# Patient Record
Sex: MALE | Race: WHITE | NOT HISPANIC OR LATINO | Employment: STUDENT | URBAN - METROPOLITAN AREA
[De-identification: names, ages, dates, MRNs, and addresses within clinical notes are randomized per-mention and may not be internally consistent; named-entity substitution may affect disease eponyms.]

---
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
## 2024-03-11 ENCOUNTER — OFFICE VISIT (OUTPATIENT)
Dept: FAMILY MEDICINE | Facility: CLINIC | Age: 19
End: 2024-03-11
Payer: COMMERCIAL

## 2024-03-11 VITALS
OXYGEN SATURATION: 100 % | HEART RATE: 68 BPM | WEIGHT: 131 LBS | TEMPERATURE: 98.2 F | BODY MASS INDEX: 17.74 KG/M2 | RESPIRATION RATE: 18 BRPM | DIASTOLIC BLOOD PRESSURE: 93 MMHG | SYSTOLIC BLOOD PRESSURE: 156 MMHG | HEIGHT: 72 IN

## 2024-03-11 DIAGNOSIS — F33.0 MAJOR DEPRESSIVE DISORDER, RECURRENT EPISODE, MILD (H): ICD-10-CM

## 2024-03-11 DIAGNOSIS — Z13.220 SCREENING FOR LIPOID DISORDERS: ICD-10-CM

## 2024-03-11 DIAGNOSIS — M62.831 MUSCLE SPASM OF RIGHT CALF: Primary | ICD-10-CM

## 2024-03-11 LAB
CHOLEST SERPL-MCNC: 162 MG/DL
ERYTHROCYTE [DISTWIDTH] IN BLOOD BY AUTOMATED COUNT: 11.8 % (ref 10–15)
FASTING STATUS PATIENT QL REPORTED: NORMAL
HCT VFR BLD AUTO: 45.6 % (ref 40–53)
HDLC SERPL-MCNC: 68 MG/DL
HGB BLD-MCNC: 15.3 G/DL (ref 13.3–17.7)
LDLC SERPL CALC-MCNC: 86 MG/DL
MCH RBC QN AUTO: 30.2 PG (ref 26.5–33)
MCHC RBC AUTO-ENTMCNC: 33.6 G/DL (ref 31.5–36.5)
MCV RBC AUTO: 90 FL (ref 78–100)
NONHDLC SERPL-MCNC: 94 MG/DL
PLATELET # BLD AUTO: 282 10E3/UL (ref 150–450)
RBC # BLD AUTO: 5.07 10E6/UL (ref 4.4–5.9)
T4 FREE SERPL-MCNC: 1.53 NG/DL (ref 0.9–1.7)
TRIGL SERPL-MCNC: 39 MG/DL
TSH SERPL DL<=0.005 MIU/L-ACNC: 5.11 UIU/ML (ref 0.3–4.2)
WBC # BLD AUTO: 6.3 10E3/UL (ref 4–11)

## 2024-03-11 PROCEDURE — 84443 ASSAY THYROID STIM HORMONE: CPT | Performed by: FAMILY MEDICINE

## 2024-03-11 PROCEDURE — 85027 COMPLETE CBC AUTOMATED: CPT | Performed by: FAMILY MEDICINE

## 2024-03-11 PROCEDURE — 84439 ASSAY OF FREE THYROXINE: CPT | Performed by: FAMILY MEDICINE

## 2024-03-11 PROCEDURE — 99214 OFFICE O/P EST MOD 30 MIN: CPT | Performed by: FAMILY MEDICINE

## 2024-03-11 PROCEDURE — 36415 COLL VENOUS BLD VENIPUNCTURE: CPT | Performed by: FAMILY MEDICINE

## 2024-03-11 PROCEDURE — 80061 LIPID PANEL: CPT | Performed by: FAMILY MEDICINE

## 2024-03-11 NOTE — PROGRESS NOTES
Assessment & Plan     Muscle spasm of right calf  Refer for PT. With the constellation of some learning delay could consider CP, but the physical findings are very isolated to the calf, more likely a prior injury to peroneal nerve or achilles injury though patient has no memory of this.  - Physical Therapy  Referral; Future    Major depressive disorder, recurrent episode, mild (H24)  Patient would like to start with psychotherapy. Consider medication-would rec bupriopion. His picture is more depressed than anxious, though his brother things of him as more anxious. He also does seem to have some executive function struggle, but likely also related to mood, not something like ADHD.  - Adult Mental Health  Referral; Future  - TSH with free T4 reflex; Future  - CBC with platelets; Future  - TSH with free T4 reflex  - CBC with platelets    Screening for lipoid disorders  - Lipid panel reflex to direct LDL Fasting; Future  - Lipid panel reflex to direct LDL Fasting    BP  Recheck in follow up                  No follow-ups on file.    Jason Emmanuel is a 19 year old, presenting for the following health issues:  Establish Care (Right leg concerns)        3/11/2024     9:08 AM   Additional Questions   Roomed by Awais   Accompanied by Brother         3/11/2024    Information    services provided? No     HPI     Jose Elias comes in with his older brother who recently moved back home with him and his dad and is noticing some challenges    Right leg problem-his brother has been taking him to the gym. They've noticed that his right foot when he does squats, he can't flex his right ankle to 90 degrees without pain. He has noticed this problem since at least age 10 or so, in middle school. Has not ever tried any medication for it. Has been trying stretching but not helping. Running on the leg doesn't hurt, can do like a half mile on the treadmill.   Struggling with some anxiety type of  symptoms. Mom struggled with alcohol and mental health and  a couple year ago. Jose Elias is really fearful of following in his mom's footsteps. Has really struggled to find a job, really fearful of driving. Has hated it when he tries working. Dad and brother really want Jose Elias to find his independence. Development- graduated high school, got extra help in Math in elementary. Excess worry but about internal things. He does have some problems with sleep, but more physical discomfort. Not overwhelmed by crowded situations or new people. Slowly ended up losing interest in driving, not scared by it. Right now he's working at [employer] but not getting a lot of hours. Thinking about doing investing. He's interest in computers but hasn't worked in computers. He has a hard time retaining his motivation. Doesn't have too much trouble with attention. Jose Elias feels really stuck. He had some suicidal ideation around the time her mom was dying of etoh complications but not recently. Mom's dad also maybe had schizophrenia.                   Objective    BP (!) 156/93   Pulse 68   Temp 98.2  F (36.8  C)   Resp 18   Ht 1.829 m (6')   Wt 59.4 kg (131 lb)   SpO2 100%   BMI 17.77 kg/m    Body mass index is 17.77 kg/m .  Physical Exam   GENERAL: alert and no distress  MS: Right ankle passive and action ROM limited to 90 degrees. Left ankle full ROM. Pain with R ankle flexion past 90 degrees as well as spasticity.            Signed Electronically by: Dorie Kiran MD

## 2024-03-11 NOTE — COMMUNITY RESOURCES LIST (ENGLISH)
03/11/2024   [facility]  N/A  For questions about this resource list or additional care needs, please contact your primary care clinic or care manager.  Phone: [phone]   Email: N/A   Address: [address] [place] [address]   Hours: N/A        Hotlines and Helplines       Hotline - Housing crisis  1  [facility] Distance: 10.35 miles      Phone/Virtual   [address] [place] [address]  Language: English  Hours: Mon - Sun Open 24 Hours   Phone: (947) 443-7156 Email: communications@[facility]-[place].org Website: https://[facility]-[place].org/oursaviourshousing/     2  [facility] Distance: 11.95 miles      Phone/Virtual   [address] [place] [address]  Language: English  Hours: Mon - Sun Open 24 Hours   Phone: (679) 101-2038 Email: info@[facility].[employer] Website: http://www.[facility].org          Housing       Coordinated Entry access point  3  [facility] Distance: 2.69 miles      In-Person, Phone/Virtual   [address] [place] [address]  Language: English  Hours: Mon - Fri 8:30 AM - 4:30 PM  Fees: Free   Phone: (494) 166-9739 Email: info@[facility].org Website: https://www.[facility].org/locations/[facility]/     4  [facility] - Coordinated Access to Housing and Shelter ([facility]) - Coordinated Access - Coordinated Entry access point Distance: 5.05 miles      In-Person, Phone/Virtual   [address] [place] [address]  Language: English  Hours: Mon - Fri 8:00 AM - 4:30 PM  Fees: Free   Phone: (487) 298-9860 Website: https://www.[facility]./residents/assistance-support/assistance/housing-services-support     Drop-in center or day shelter  5  [facility] Distance: 1.18 miles      In-Person   [address] [place] [address]  Language: English  Hours: Mon - Fri 9:00 AM - 4:00 PM  Fees: Free   Phone: (297) 997-6234 Email: charito@[facility].org Website:  http://[facility] Hospital[facility].org     6  [facility] Distance: 2.21 miles      In-Person   [address] [place] [address]  Language: English  Hours: Mon - Fri 10:00 AM - 6:00 PM  Fees: Free   Phone: (194) 345-5024 Email: [employer] Website: https://[employer]/support/youth/     Housing search assistance  7  [facility] Distance: 2.21 miles      In-Person, Phone/Virtual   [address] [place] [address]  Language: English  Hours: Mon - Fri 10:00 AM - 6:00 PM  Fees: Free   Phone: (637) 494-6641 Email: [employer] Website: https://[employer]/support/youth/     8  [facility] - Housing Search Assistance Distance: 2.47 miles      Phone/Virtual   [address] [place] [address]  Language: Wolof, English, Hmong, Arabella, Kazakh, Persian  Hours: Mon - Fri Appt. Only  Fees: Free   Phone: (746) 785-6957 Website: https://[employer] [facility].org/     Shelter for families  9  [facility] Distance: 13.85 miles      In-Person   [address] [place] [address]  Language: English  Hours: Mon - Fri 3:00 PM - 9:00 AM , Sat - Sun Open 24 Hours  Fees: Free   Phone: (799) 330-5339 Ext.1 Website: https://www.[facility].org/2020/07/03/emergency-family-shelter/     Shelter for individuals  10  [place] [facility] - Housing Help Distance: 2.39 miles      Phone/Virtual   [address] [place] [address]  Language: English  Hours: Mon - Fri 8:00 AM - 5:00 PM   Phone: (197) 370-4692 Email: [place].housing@[facility].mn. Website: https://[facility].org/     11  [facility] [place] [facility] - [facility] [place] [facility] Distance: 2.73 miles      In-Person   [address] [place] [address]  Language: English  Hours: Mon - Sun 5:00 PM - 10:00 AM  Fees: Free, Self Pay   Phone: (128) 767-9891 Email: info@[employer] Website:  https://www.[employer]/locations/[facility]/     Shelter for youth  12  [facility] - Emergency Youth Shelter Distance: 5.58 miles      In-Person   [address] [place] [address]  Language: English  Hours: Mon - Sun Open 24 Hours  Fees: Free   Phone: (450) 462-2426 Email: diandra@[facility].org Website: https://[facility]/     13  [facility] - Emergency Youth Shelter Distance: 8.72 miles      In-Person   [address] [place] [address]  Language: English  Hours: Mon - Sun Open 24 Hours  Fees: Free   Phone: (130) 695-5779 Email: info@[employer] Website: https://www.[employer]/locations/hope-[place]/          Important Numbers & Websites       Emergency Services   [address]  [place] [address]  Poison Control   (469) 879-8369  Suicide Prevention Lifeline   (114) 894-6061 (TALK)  Child Abuse Hotline   (214) 565-2537 (4-A-Child)  Sexual Assault Hotline   (117) 144-7200 (HOPE)  National Runaway Safeline   (417) 760-7527 (RUNAWAY)  All-Options Talkline   (221) 309-4616  Substance Abuse Referral   (922) 260-5240 (HELP)

## 2024-03-14 DIAGNOSIS — E03.8 SUBCLINICAL HYPOTHYROIDISM: Primary | ICD-10-CM

## 2024-03-25 ENCOUNTER — TELEPHONE (OUTPATIENT)
Dept: CARE COORDINATION | Facility: CLINIC | Age: 19
End: 2024-03-25
Payer: COMMERCIAL

## 2024-03-25 NOTE — CONFIDENTIAL NOTE
SW attempted to call patient regarding referral for psychotherapy but number not in service this date.    ROCHELLE CADET, LGSW, LADC

## 2024-04-22 ENCOUNTER — VIRTUAL VISIT (OUTPATIENT)
Dept: PSYCHOLOGY | Facility: CLINIC | Age: 19
End: 2024-04-22
Attending: FAMILY MEDICINE
Payer: COMMERCIAL

## 2024-04-22 DIAGNOSIS — F33.0 MAJOR DEPRESSIVE DISORDER, RECURRENT EPISODE, MILD (H): ICD-10-CM

## 2024-04-22 PROCEDURE — 90791 PSYCH DIAGNOSTIC EVALUATION: CPT | Mod: 95 | Performed by: COUNSELOR

## 2024-04-22 ASSESSMENT — COLUMBIA-SUICIDE SEVERITY RATING SCALE - C-SSRS
6. HAVE YOU EVER DONE ANYTHING, STARTED TO DO ANYTHING, OR PREPARED TO DO ANYTHING TO END YOUR LIFE?: YES
REASONS FOR IDEATION LIFETIME: MOSTLY TO END OR STOP THE PAIN (YOU COULDN'T GO ON LIVING WITH THE PAIN OR HOW YOU WERE FEELING)
1. HAVE YOU WISHED YOU WERE DEAD OR WISHED YOU COULD GO TO SLEEP AND NOT WAKE UP?: YES
4. HAVE YOU HAD THESE THOUGHTS AND HAD SOME INTENTION OF ACTING ON THEM?: NO
2. HAVE YOU ACTUALLY HAD ANY THOUGHTS OF KILLING YOURSELF?: YES
3. HAVE YOU BEEN THINKING ABOUT HOW YOU MIGHT KILL YOURSELF?: YES
4. HAVE YOU HAD THESE THOUGHTS AND HAD SOME INTENTION OF ACTING ON THEM?: YES
TOTAL  NUMBER OF INTERRUPTED ATTEMPTS LIFETIME: NO
2. HAVE YOU ACTUALLY HAD ANY THOUGHTS OF KILLING YOURSELF?: NO
TOTAL  NUMBER OF PREPARATORY ACTS LIFETIME: 1
TOTAL  NUMBER OF ABORTED OR SELF INTERRUPTED ATTEMPTS LIFETIME: YES
5. HAVE YOU STARTED TO WORK OUT OR WORKED OUT THE DETAILS OF HOW TO KILL YOURSELF? DO YOU INTEND TO CARRY OUT THIS PLAN?: NO
TOTAL  NUMBER OF ABORTED OR SELF INTERRUPTED ATTEMPTS LIFETIME: 1
6. HAVE YOU EVER DONE ANYTHING, STARTED TO DO ANYTHING, OR PREPARED TO DO ANYTHING TO END YOUR LIFE?: NO
ATTEMPT LIFETIME: NO
1. IN THE PAST MONTH, HAVE YOU WISHED YOU WERE DEAD OR WISHED YOU COULD GO TO SLEEP AND NOT WAKE UP?: NO
TOTAL  NUMBER OF ABORTED OR SELF INTERRUPTED ATTEMPTS PAST 3 MONTHS: NO

## 2024-04-22 ASSESSMENT — ANXIETY QUESTIONNAIRES
1. FEELING NERVOUS, ANXIOUS, OR ON EDGE: NOT AT ALL
5. BEING SO RESTLESS THAT IT IS HARD TO SIT STILL: NOT AT ALL
GAD7 TOTAL SCORE: 0
6. BECOMING EASILY ANNOYED OR IRRITABLE: NOT AT ALL
GAD7 TOTAL SCORE: 0
4. TROUBLE RELAXING: NOT AT ALL
3. WORRYING TOO MUCH ABOUT DIFFERENT THINGS: NOT AT ALL
2. NOT BEING ABLE TO STOP OR CONTROL WORRYING: NOT AT ALL
7. FEELING AFRAID AS IF SOMETHING AWFUL MIGHT HAPPEN: NOT AT ALL

## 2024-04-22 ASSESSMENT — PATIENT HEALTH QUESTIONNAIRE - PHQ9
10. IF YOU CHECKED OFF ANY PROBLEMS, HOW DIFFICULT HAVE THESE PROBLEMS MADE IT FOR YOU TO DO YOUR WORK, TAKE CARE OF THINGS AT HOME, OR GET ALONG WITH OTHER PEOPLE: SOMEWHAT DIFFICULT
SUM OF ALL RESPONSES TO PHQ QUESTIONS 1-9: 10
SUM OF ALL RESPONSES TO PHQ QUESTIONS 1-9: 10

## 2024-04-22 NOTE — PROGRESS NOTES
"[facility]         PATIENT'S NAME: Cholo Friend  PREFERRED NAME: Jose Elias  PRONOUNS:       MRN: [id number]  : 2005  ADDRESS: [address] [place] [address]  ACCT. NUMBER:  [id number]  DATE OF SERVICE: 24  START TIME: 11:00a  END TIME: 11:55a  PREFERRED PHONE: [phone]  May we leave a program related message: Yes  EMERGENCY CONTACT: was obtained .  SERVICE MODALITY:  Video Visit:      Provider verified identity through the following two step process.  Patient provided:  Patient     Telemedicine Visit: The patient's condition can be safely assessed and treated via synchronous audio and visual telemedicine encounter.      Reason for Telemedicine Visit: Patient has requested telehealth visit    Originating Site (Patient Location): Patient's home    Distant Site (Provider Location): Provider Remote Setting- Home Office    Consent:  The patient/guardian has verbally consented to: the potential risks and benefits of telemedicine (video visit) versus in person care; bill my insurance or make self-payment for services provided; and responsibility for payment of non-covered services.     Patient would like the video invitation sent by:  My Chart    Mode of Communication:  Video Conference via [employer]    Distant Location (Provider):  Off-site    As the provider I attest to compliance with applicable laws and regulations related to telemedicine.    UNIVERSAL ADULT Mental Health DIAGNOSTIC ASSESSMENT    Identifying Information:  Patient is a 19 year old,   individual.  Patient was referred for an assessment by primary care clinic.  Patient attended the session alone.    Chief Complaint:   The reason for seeking services at this time is: \"Mental Health\".  Pt reported he was recommended by his PCP for therapy but was not entirely sure why he was here. Pt reported he does have some depressive symptoms. The problem(s) began 19.    Patient has not attempted to resolve " these concerns in the past.    Social/Family History:  Patient reported they grew up in other [facility].  They were raised by biological parents  .  Parents were always together.  Pt reported growing up in a small town in [place] and noted this was very nice. Pt reported they moved to [place] when he was in 4th grade due to financial issues and wanting to be closer to family. Pt reported living with his mom, dad, and older brother. Pt reported he currently lives in [facility] with his brother and dad. Pt reported his mom passed away 3 years ago due to alcoholism. Pt noted her addiction impacted his childhood in that his parents would fight. Pt noted that did take a toll on him and both he and his brother coped by staying out of the house when fighting was happening. Pt noted having support from extended family as well.      The patient describes their cultural background as .  Cultural influences and impact on patient's life structure, values, norms, and healthcare: n/a.  Contextual influences on patient's health include: Contextual Factors: Family Factors death of mom .    These factors will be addressed in the Preliminary Treatment plan. Patient identified their preferred language to be English. Patient reported they does not need the assistance of an  or other support involved in therapy.     Patient reported had no significant delays in developmental tasks.   Patient's highest education level was high school graduate  .  Patient identified the following learning problems: none reported.  Modifications will not be used to assist communication in therapy.  Patient reports they are  able to understand written materials.Pt reported he was mostly an  average  student but noted graduating high school with no problem. Pt reported socially, he had a lot of friends in school. Pt noted he was bullied all throughout his school years for his emotions and physical appearance. Pt reported highest  education level as high school graduate.      Patient reported the following relationship history Pt reported dating for a year in high school but noted he has not dated since. Pt reported that relationship ended because of the stress and distancing related to covid.  .  Patient's current relationship status is single for 3 years.   Patient identified their sexual orientation as other.  Patient reported having   zero child(adama). Patient identified parents as part of their support system.  Patient identified the quality of these relationships as good,  .      Patient's current living/housing situation involves staying with someone.  The immediate members of family and household include Jonh Friend, 61,Father and Barrett, Brother, and they report that housing is stable.    Patient is currently unemployed. Pt reported work hx of working at the [employer] for a year as a . Pt reported he eventually lost interest in this and got a second job a [employer]. Pt reported working this job for about 4 months but left this job because the scheduling system was too stressful. Pt reported he is currently unemployed but would like to find a job. Pt reported he is considering getting into construction. Pt reported he also has a passion for cooking.  Patient does not identify finances as a current stressor.      Patient reported that they have not been involved with the legal system.    . Patient does not report being under probation/ parole/ jurisdiction. They are not under any current court jurisdiction. .    Patient's Strengths and Limitations:  Patient identified the following strengths or resources that will help them succeed in treatment: exercise routine, friends / good social support, family support, and intelligence. Things that may interfere with the patient's success in treatment include: none identified.     Assessments:  The following assessments were completed by patient for this visit:  PHQ9:        4/22/2024    10:13 AM   PHQ-9 SCORE   PHQ-9 Total Score MyChart 10 (Moderate depression)   PHQ-9 Total Score 10     GAD7:        No data to display              CAGE-AID:       4/22/2024    10:24 AM   CAGE-AID Total Score   Total Score 0   Total Score MyChart 0 (A total score of 2 or greater is considered clinically significant)     PROMIS 10-Global Health (all questions and answers displayed):       4/22/2024    10:24 AM   PROMIS 10   In general, would you say your health is: Fair   In general, would you say your quality of life is: Good   In general, how would you rate your physical health? Good   In general, how would you rate your mental health, including your mood and your ability to think? Fair   In general, how would you rate your satisfaction with your social activities and relationships? Very good   In general, please rate how well you carry out your usual social activities and roles Very good   To what extent are you able to carry out your everyday physical activities such as walking, climbing stairs, carrying groceries, or moving a chair? Completely   In the past 7 days, how often have you been bothered by emotional problems such as feeling anxious, depressed, or irritable? Sometimes   In the past 7 days, how would you rate your fatigue on average? Moderate   In the past 7 days, how would you rate your pain on average, where 0 means no pain, and 10 means worst imaginable pain? 2   In general, would you say your health is: 2   In general, would you say your quality of life is: 3   In general, how would you rate your physical health? 3   In general, how would you rate your mental health, including your mood and your ability to think? 2   In general, how would you rate your satisfaction with your social activities and relationships? 4   In general, please rate how well you carry out your usual social activities and roles. (This includes activities at home, at work and in your community, and responsibilities  as a parent, child, spouse, employee, friend, etc.) 4   To what extent are you able to carry out your everyday physical activities such as walking, climbing stairs, carrying groceries, or moving a chair? 5   In the past 7 days, how often have you been bothered by emotional problems such as feeling anxious, depressed, or irritable? 3   In the past 7 days, how would you rate your fatigue on average? 3   In the past 7 days, how would you rate your pain on average, where 0 means no pain, and 10 means worst imaginable pain? 2   Global Mental Health Score 12   Global Physical Health Score 15   PROMIS TOTAL - SUBSCORES 27     Nettie Suicide Severity Rating Scale (Lifetime/Recent)       No data to display                Personal and Family Medical History:  Patient does not report a family history of mental health concerns.  Patient reports family history is not on file.. Pt reported familial hx of addiction, depression, and schizophrenia.      Patient does not report Mental Health Diagnosis or Treatment.      Patient has had a physical exam to rule out medical causes for current symptoms.  Date of last physical exam was within the past year. Client was encouraged to follow up with PCP if symptoms were to develop. The patient has a [facility] Provider, who is named Claudia Chairez..  Patient reports the following current medical concerns: dental and vision .  Patient denies any issues with pain..   There are not significant appetite / nutritional concerns / weight changes.   Patient does not report a history of head injury / trauma / cognitive impairment.      Patient reports not taking any current medications    Medication Adherence:  Patient reports not currently prescribed.Pt reported he is not currently on psychiatric medications, has never been, and is not interested.    .    Patient Allergies:    Allergies   Allergen Reactions    No Known Allergies        Medical History:  No past medical history on  file.      Current Mental Status Exam:   Appearance:  Appropriate    Eye Contact:  Poor  Psychomotor:  Normal       Gait / station:  no problem  Attitude / Demeanor: Cooperative  Guarded   Speech      Rate / Production: Normal/ Responsive      Volume:  Normal  volume      Language:  intact  Mood:   Anxious   Affect:   Constricted    Thought Content: Clear   Thought Process: Coherent  Circumstantial      Associations: Rambling  Insight:   Fair   Judgment:  Intact   Orientation:  All  Attention/concentration: Fair    Substance Use:   Patient did report a family history of substance use concerns; see medical history section for details.  Patient has not received chemical dependency treatment in the past.  Patient has not ever been to detox.      Pt noted he has never used chemicals and noted this was because of the addiction he witnessed with his mom.      Patient is not currently receiving any chemical dependency treatment.           Substance History of use Age of first use Date of last use     Pattern and duration of use (include amounts and frequency)   Alcohol never used       REPORTS SUBSTANCE USE: N/A   Cannabis   never used     REPORTS SUBSTANCE USE: N/A     Amphetamines   never used     REPORTS SUBSTANCE USE: N/A   Cocaine/crack    never used       REPORTS SUBSTANCE USE: N/A   Hallucinogens never used         REPORTS SUBSTANCE USE: N/A   Inhalants never used         REPORTS SUBSTANCE USE: N/A   Heroin never used         REPORTS SUBSTANCE USE: N/A   Other Opiates never used     REPORTS SUBSTANCE USE: N/A   Benzodiazepine   never used     REPORTS SUBSTANCE USE: N/A   Barbiturates never used     REPORTS SUBSTANCE USE: N/A   Over the counter meds never used     REPORTS SUBSTANCE USE: N/A   Caffeine never used     REPORTS SUBSTANCE USE: N/A   Nicotine  never used     REPORTS SUBSTANCE USE: N/A   Other substances not listed above:  Identify:  never used     REPORTS SUBSTANCE USE: N/A     Patient reported the  following problems as a result of their substance use: no problems, not applicable.    Substance Use: No symptoms    Based on the negative CAGE score and clinical interview there  are not indications of drug or alcohol abuse.    Significant Losses / Trauma / Abuse / Neglect Issues:   Patient did not  serve in the .  There are indications or report of significant loss, trauma, abuse or neglect issues related to: death of his mom .  Concerns for possible neglect are not present.     Safety Assessment:   Patient denies current homicidal ideation and behaviors.  Patient denies current self-injurious ideation and behaviors.    Patient denied risk behaviors associated with substance use.   Patient denies any high risk behaviors associated with mental health symptoms.  Patient reports the following current concerns for their personal safety: None.  Patient reports there are firearms in the house.     yes, they are secured. The firearms are secured in a locked space.    History of Safety Concerns:  Patient denied a history of homicidal ideation.     Patient denied a history of personal safety concerns.    Patient denied a history of assaultive behaviors.    Patient denied a history of sexual assault behaviors.     Patient denied a history of risk behaviors associated with substance use.  Patient denies any history of high risk behaviors associated with mental health symptoms.  Patient reports the following protective factors: safe and stable environment    Risk Plan:  See Recommendations for Safety and Risk Management Plan    Review of Symptoms per patient report:   Depression: Lack of interest, Change in energy level, Suicidal ideation, Feelings of hopelessness, Low self-worth, Feeling sad, down, or depressed, Withdrawn, and Poor hygeine  Ayla:  No Symptoms  Psychosis: No Symptoms  Anxiety: No Symptoms  Panic:  No symptoms  Post Traumatic Stress Disorder:  No Symptoms   Eating Disorder: No Symptoms  ADD /  ADHD:  No symptoms  Conduct Disorder: No symptoms  Autism Spectrum Disorder: No symptoms  Obsessive Compulsive Disorder: No Symptoms    Patient reports the following compulsive behaviors and treatment history:  none .      Diagnostic Criteria:   Major Depressive Disorder  CRITERIA (A-C) REPRESENT A MAJOR DEPRESSIVE EPISODE - SELECT THESE CRITERIA  A) Recurrent episode(s) - symptoms have been present during the same 2-week period and represent a change from previous functioning 5 or more symptoms (required for diagnosis)   - Depressed mood. Note: In children and adolescents, can be irritable mood.     - Diminished interest or pleasure in all, or almost all, activities.    - Increased sleep.    - Fatigue or loss of energy.    - Diminished ability to think or concentrate, or indecisiveness.   B) The symptoms cause clinically significant distress or impairment in social, occupational, or other important areas of functioning  C) The episode is not attributable to the physiological effects of a substance or to another medical condition  D) The occurence of major depressive episode is not better explained by other thought / psychotic disorders  E) There has never been a manic episode or hypomanic episode    Functional Status:  Patient reports the following functional impairments:  health maintenance, management of the household and or completion of tasks, self-care, and work / vocational responsibilities.     Nonprogrammatic care:  Patient is requesting basic services to address current mental health concerns.    Clinical Summary:  1. Psychosocial, Cultural and Contextual Factors: Death of mom, unemployment  .  2. Principal DSM5 Diagnoses  (Sustained by DSM5 Criteria Listed Above):   296.32 (F33.1) Major Depressive Disorder, Recurrent Episode, Moderate _.  3. Other Diagnoses that is relevant to services:   NA.  4. Provisional Diagnosis:  NA at this time .  5. Prognosis: Relieve Acute Symptoms.  6. Likely consequences of  symptoms if not treated: Pt will likely continue to experience depressive symptoms that are making it difficult for him to engage in self care, health maintenance, employment, and relationships.  7. Client strengths include:  caring, good listener, intelligent, and open to learning .     Recommendations:     1. Plan for Safety and Risk Management:   Safety and Risk: A safety and risk management plan has been developed including: Patient consented to co-developed safety plan.  Safety and risk management plan was completed - see below.  Patient agreed to use safety plan should any safety concerns arise.  A copy was given to the patient..          Report to child / adult protection services was NA.     2. Patient's identified mental health concerns with a cultural influence will be addressed by individual therapy .     3. Initial Treatment will focus on:    Depressed Mood - developing healthy coping tools .     4. Resources/Service Plan:    services are not indicated.   Modifications to assist communication are not indicated.   Additional disability accommodations are not indicated.      5. Collaboration:   Collaboration / coordination of treatment will be initiated with the following  support professionals: primary care physician.      6.  Referrals:   The following referral(s) will be initiated:  NA at this time .       A Release of Information has been obtained for the following: NA at this time.     Clinical Substantiation/medical necessity for the above recommendations:  Pt appears with ongoing symptoms of depression interfering with pt's ability to maintain employment and  engage in health maintenance, healthy relationships, and self care.He would benefit from individual OP therapy in order to develop healthy coping tools for these symptoms.     7. RIMA:    RIMA:  Discussed the general effects of drugs and alcohol on health and well-being. Provider gave patient printed information about the effects of  chemical use on their health and well being. Recommendations:  NA - pt does not use any substances.     8. Records:   These were reviewed at time of assessment.   Information in this assessment was obtained from the medical record and  provided by patient who is a fair historian.    Patient will have open access to their mental health medical record.    9.   Interactive Complexity: No    10. Safety Plan:   Ramesh Safety Plan      Creation Date: 4/22/24       Step 1: Warning signs:    Warning Signs    Worsening depression symptoms; hopelessness; feeling like death would be a way to stop the pain      Step 2: Internal coping strategies - Things I can do to take my mind off my problems without contacting another person:    Strategies    Pet Max the cat, exercise, play video games      Step 3: People and social settings that provide distraction:    Name Contact Information    Brother Yeny Lopez [phone]       Places    video games with friends    gym with brother    out to eat with friends/family      Step 4: People whom I can ask for help during a crisis:    Name Contact Information    Brother Yeny Lopez [phone]      Step 5: Professionals or agencies I can contact during a crisis:    Clinician/Agency Name Phone Emergency Contact    [facility] Crisis [phone]       [facility] Emergency Department Emergency Department Address Emergency Department Phone    [facility] [email or web address] [facility] [address], [place], [place] [id number] [phone]      Suicide Prevention Lifeline Phone: Call or Text 755  Crisis Text Line: Text HOME to 307292     Step 6: Making the environment safer (plan for lethal means safety):   Hanging, drowning, or being shot - ensure the guns are locked away; reach out to your brother and dad for distraction and support.      Optional: What is most important to me and worth living for?:   To ensure that dad and brother don't go  through the pain of losing you  To honor mom by staying alive  Hope that things will get better     Ramesh Safety Plan. Lucy Mora and [employer]. Used with permission of the authors.         Provider Name/ Credentials:  MARSHA Sarabia, [facility]  April 22, 2024       Answers submitted by the patient for this visit:  Patient Health Questionnaire (Submitted on 4/22/2024)  If you checked off any problems, how difficult have these problems made it for you to do your work, take care of things at home, or get along with other people?: Somewhat difficult  PHQ9 TOTAL SCORE: 10

## 2024-04-26 ENCOUNTER — THERAPY VISIT (OUTPATIENT)
Dept: PHYSICAL THERAPY | Facility: REHABILITATION | Age: 19
End: 2024-04-26
Attending: FAMILY MEDICINE
Payer: COMMERCIAL

## 2024-04-26 DIAGNOSIS — M62.831 MUSCLE SPASM OF RIGHT CALF: ICD-10-CM

## 2024-04-26 PROCEDURE — 97161 PT EVAL LOW COMPLEX 20 MIN: CPT | Mod: GP

## 2024-04-26 PROCEDURE — 97110 THERAPEUTIC EXERCISES: CPT | Mod: GP

## 2024-04-26 NOTE — PROGRESS NOTES
PHYSICAL THERAPY EVALUATION  Type of Visit: Evaluation    See electronic medical record for Abuse and Falls Screening details.    Subjective       Presenting condition or subjective complaint: Muscle indevelopment in right calf  Date of onset: 03/11/24 (referral date)    Relevant medical history: Vision problems   Dates & types of surgery:      Prior diagnostic imaging/testing results:       Prior therapy history for the same diagnosis, illness or injury: No      Pt is a 19 year old male presenting with complaints of right calf pain. Pt's brother reports that for as long as he can remember, the pt has walked on his toes. Pt and his brother have been trying to go to the gym a few times each week. Pt reports limited ROM. Pt does not report much pain but just has limited ROM    The symptoms started around 2016 and has not been getting better.    Prior treatments: none    Current level of function: makes things more challenging     Sleep Quality: not affected     Red Flags: none    Pt goals:stand straight, gain ROM, walk heel to toe     pmh: learning delay, major depressive disorder (recurrent episode, mild)    Living Environment  Social support: With family members   Type of home: House   Stairs to enter the home: Yes 2 Is there a railing: Yes   Ramp: No   Stairs inside the home: Yes 2 Is there a railing: Yes   Help at home: None  Equipment owned:       Employment: No    Hobbies/Interests: Gym, video games    Patient goals for therapy: Stand straight     Objective   ANKLE:    Observation: Decreased calf muscle mass B, but R>L; Difficulty touching heel to floor in seating with knees at 90 degrees on R (though improved after stretching); increased arch height B in non-Wbing (but flattened with standing position). Significant 1st MTP joint bump (1st MTP flexion) with phalange hyperextension    Standing posture: flattened arches, toe extension, unable to touch heel to floor on R, straining on R    Effusion (Figure 8):  NT    Gait: Toe walking on R, quick heel rise on L but able to     Functional Screen:   -Squat: unable to keep right heel on ground; dynamic knee valgus  -SL Balance: NT  -Heel walking: unable   -Toe walking: typical gait pattern      ROM: (* indicates patient's pain)   AROM L AROM R PROM L PROM R   Dorsiflexion Lacking ~15 Lackng ~45 Lacking 13 Minimal improvement   Plantarflexion 80 90     Inversion  20 40 30 40   Eversion  10 8 15 10   G Toe Ext Sits in toe ext Sits in toe ext     G Toe Flex Mod limitation Mod limitation       *ROM lack accuracy due to landmark abnormalities within the foot*    End-Feels: Firm     Strength: (*indicates patient's pain)   MMT L MMT R   Dorsiflexion     Plantarflexion 10/20 SL heel raises -/20 SL heel raises   INV     EV     Great toe flex     Great toe ext         Palpation: No significant TTP in foot, though tenderness with PROM of great toe flexion B    Ankle/Foot Mobilizations (hyper vs hypo): NT     Hip/Knee Screen Relevant Findings: hamstring length limited (~45 degrees B); hip flex (90/90), IR and ER WFL       Special Tests:   L R   Anterior Drawer     Posterior Drawer     Valgus Stress     Varus Stress     External Rotation     Heath's (Achilles)     Calcaneal tap     Squeeze test     Windlass Test     Niki's (DVT)       Eval assessment: upon evaluation, pt is very limited in DF ROM and has a resting posture in significant PF. Pt also has anatomical abnormalities, likely resulting from toe walking in the past.    Assessment & Plan   CLINICAL IMPRESSIONS  Medical Diagnosis: muscle spasm of right calf    Treatment Diagnosis: Gastrocnemius/soleus tightness; hamstring tigthness; abnormal gait;   Impression/Assessment: Patient is a 19 year old male with right calf pain complaints.  The following significant findings have been identified: Pain, Decreased ROM/flexibility, Decreased joint mobility, Decreased strength, Impaired balance, Impaired gait, Impaired muscle performance,  and Decreased activity tolerance. These impairments interfere with their ability to perform self care tasks, work tasks, recreational activities, household chores, driving , household mobility, and community mobility as compared to previous level of function.     Clinical Decision Making (Complexity):  Clinical Presentation: Stable/Uncomplicated  Clinical Presentation Rationale: based on medical and personal factors listed in PT evaluation  Clinical Decision Making (Complexity): Low complexity    PLAN OF CARE  Treatment Interventions:  Modalities: Cryotherapy, Hot Pack, Ultrasound  Interventions: Gait Training, Manual Therapy, Neuromuscular Re-education, Therapeutic Activity, Therapeutic Exercise, Self-Care/Home Management    Long Term Goals            Frequency of Treatment: 1x/week, decreasing to every other  Duration of Treatment: 16 weeks    Recommended Referrals to Other Professionals:  none  Education Assessment:   Learner/Method: Patient    Risks and benefits of evaluation/treatment have been explained.   Patient/Family/caregiver agrees with Plan of Care.     Evaluation Time:          Signing Clinician: Rachell Dexter, TL      [facility]                                                                                   OUTPATIENT PHYSICAL THERAPY      PLAN OF TREATMENT FOR OUTPATIENT REHABILITATION   Patient's Last Name, First Name, Cholo Jesus YOB: 2005   Provider's Name   [facility]   Medical Record No.  [id number]     Onset Date: 03/11/24 (referral date)  Start of Care Date: 04/26/24     Medical Diagnosis:  muscle spasm of right calf      PT Treatment Diagnosis:  Gastrocnemius/soleus tightness; hamstring tigthness; abnormal gait; Plan of Treatment  Frequency/Duration: 1x/week, decreasing to every other/ 16 weeks    Certification date from 04/26/24 to 07/24/24         See note for plan of treatment details and  functional goals     Rachell Dexter, TL                         I CERTIFY THE NEED FOR THESE SERVICES FURNISHED UNDER        THIS PLAN OF TREATMENT AND WHILE UNDER MY CARE     (Physician attestation of this document indicates review and certification of the therapy plan).              Referring Provider:  Dorie Kiran    Initial Assessment  See Epic Evaluation- Start of Care Date: 04/26/24

## 2024-04-30 ENCOUNTER — VIRTUAL VISIT (OUTPATIENT)
Dept: PSYCHOLOGY | Facility: CLINIC | Age: 19
End: 2024-04-30
Payer: COMMERCIAL

## 2024-04-30 DIAGNOSIS — F33.1 MDD (MAJOR DEPRESSIVE DISORDER), RECURRENT EPISODE, MODERATE (H): Primary | ICD-10-CM

## 2024-04-30 PROCEDURE — 90837 PSYTX W PT 60 MINUTES: CPT | Mod: 95 | Performed by: COUNSELOR

## 2024-04-30 NOTE — PROGRESS NOTES
[facility]                                     Progress Note    Patient Name: Cholo Friend  Date: 4/30/24         Service Type: Individual      Session Start Time: 1:00p  Session End Time: 1:55p     Session Length: 55 minutes    Session #: 2    Attendees: Client attended alone    Service Modality:  Video Visit:      Provider verified identity through the following two step process.  Patient provided:  Patient was verified at admission/transfer    Telemedicine Visit: The patient's condition can be safely assessed and treated via synchronous audio and visual telemedicine encounter.      Reason for Telemedicine Visit: Patient has requested telehealth visit    Originating Site (Patient Location): Patient's home    Distant Site (Provider Location): Provider Remote Setting- Home Office    Consent:  The patient/guardian has verbally consented to: the potential risks and benefits of telemedicine (video visit) versus in person care; bill my insurance or make self-payment for services provided; and responsibility for payment of non-covered services.     Patient would like the video invitation sent by:  My Chart    Mode of Communication:  Video Conference via Amwell    Distant Location (Provider):  Off-site    As the provider I attest to compliance with applicable laws and regulations related to telemedicine.    DATA  Interactive Complexity: No  Crisis: No        Progress Since Last Session (Related to Symptoms / Goals / Homework):   Symptoms: No change pt reported ongoing depressive symptoms    Homework: Partially completed      Episode of Care Goals: Satisfactory progress - CONTEMPLATION (Considering change and yet undecided); Intervened by assessing the negative and positive thinking (ambivalence) about behavior change     Current / Ongoing Stressors and Concerns:   Today, pt reported setting a goal of building a routine. Writer inquired what pt meant by this and utilized the miracle question. Pt was  not able to answered and shared he is not sure what he wants out of life. Pt was able to ID that he would like more financial freedom, a job that he is passionate about, having his own family. Pt and writer discussed how we might set some goals around first steps in the direction of these dreams. Pt shared he struggles with procrastination. We discussed how pt can hold himself accountable through behavioral changes. Pt and writer discussed smart goals. Pt and writer discussed his mom and the grief associated with her loss.       Treatment Objective(s) Addressed in This Session:   Decrease frequency and intensity of feeling down, depressed, hopeless  increase understanding of steps in the grief process  identify two areas of life that you would like to have improved functioning       Intervention:  Skills training  Explore skills useful to client in current situation  Skills include assertiveness, communication, conflict management, problem-solving, relaxation, etc.     Solution-Focused Therapy  Explore patterns in patient's relationships and discussed options for new behaviors  Explore patterns in patient's actions and choices and discussed options for new behaviors     Cognitive-behavioral Therapy  Discuss common cognitive distortions, identified them in patient's life  Explore ways to challenge, replace, and act against these cognitions      Behavioral Activation  Discuss steps patient can take to become more involved in meaningful activity  Identify barriers to these activities and explored possible solutions    Assessments completed prior to visit:  The following assessments were completed by patient for this visit:  PHQ9:       4/22/2024    10:13 AM   PHQ-9 SCORE   PHQ-9 Total Score MyChart 10 (Moderate depression)   PHQ-9 Total Score 10     PROMIS 10-Global Health (all questions and answers displayed):       4/22/2024    10:24 AM   PROMIS 10   In general, would you say your health is: Fair   In general, would  you say your quality of life is: Good   In general, how would you rate your physical health? Good   In general, how would you rate your mental health, including your mood and your ability to think? Fair   In general, how would you rate your satisfaction with your social activities and relationships? Very good   In general, please rate how well you carry out your usual social activities and roles Very good   To what extent are you able to carry out your everyday physical activities such as walking, climbing stairs, carrying groceries, or moving a chair? Completely   In the past 7 days, how often have you been bothered by emotional problems such as feeling anxious, depressed, or irritable? Sometimes   In the past 7 days, how would you rate your fatigue on average? Moderate   In the past 7 days, how would you rate your pain on average, where 0 means no pain, and 10 means worst imaginable pain? 2   In general, would you say your health is: 2   In general, would you say your quality of life is: 3   In general, how would you rate your physical health? 3   In general, how would you rate your mental health, including your mood and your ability to think? 2   In general, how would you rate your satisfaction with your social activities and relationships? 4   In general, please rate how well you carry out your usual social activities and roles. (This includes activities at home, at work and in your community, and responsibilities as a parent, child, spouse, employee, friend, etc.) 4   To what extent are you able to carry out your everyday physical activities such as walking, climbing stairs, carrying groceries, or moving a chair? 5   In the past 7 days, how often have you been bothered by emotional problems such as feeling anxious, depressed, or irritable? 3   In the past 7 days, how would you rate your fatigue on average? 3   In the past 7 days, how would you rate your pain on average, where 0 means no pain, and 10 means worst  imaginable pain? 2   Global Mental Health Score 12   Global Physical Health Score 15   PROMIS TOTAL - SUBSCORES 27         ASSESSMENT: Current Emotional / Mental Status (status of significant symptoms):   Risk status (Self / Other harm or suicidal ideation)   Patient denies current fears or concerns for personal safety.   Patient denies current or recent suicidal ideation or behaviors.   Patient denies current or recent homicidal ideation or behaviors.   Patient denies current or recent self injurious behavior or ideation.   Patient denies other safety concerns.   Patient reports there has been no change in risk factors since their last session.     Patient reports there has been no change in protective factors since their last session.     Recommended that patient call 911 or go to the local ED should there be a change in any of these risk factors.     Appearance:   Appropriate    Eye Contact:   Poor   Psychomotor Behavior: Normal    Attitude:   Cooperative  Interested Guarded    Orientation:   All   Speech    Rate / Production: Monotone     Volume:  Normal    Mood:    Anxious  Sad    Affect:    Flat    Thought Content:  Clear    Thought Form:  Coherent  Logical    Insight:    Good  and Fair      Medication Review:     No current outpatient medications on file.     No current facility-administered medications for this visit.         Medication Compliance:   NA     Changes in Health Issues:   None reported     Chemical Use Review:   Substance Use: Chemical use reviewed, no active concerns identified      Tobacco Use: No current tobacco use.      Diagnosis:  296.32 (F33.1) Major Depressive Disorder, Recurrent Episode, Moderate _    Collateral Reports Completed:   Not Applicable    PLAN: (Patient Tasks / Therapist Tasks / Other)  Use SMART goals to develop a morning routine.       Ramesh Safety Plan      Creation Date: 4/22/24       Step 1: Warning signs:    Warning Signs    Worsening depression symptoms;  hopelessness; feeling like death would be a way to stop the pain      Step 2: Internal coping strategies - Things I can do to take my mind off my problems without contacting another person:    Strategies    Pet Max the cat, exercise, play video games      Step 3: People and social settings that provide distraction:    Name Contact Information    Brother Yeny Lopez [phone]       Places    video games with friends    gym with brother    out to eat with friends/family      Step 4: People whom I can ask for help during a crisis:    Name Contact Information    Brother Yeny Lopez [phone]      Step 5: Professionals or agencies I can contact during a crisis:    Clinician/Agency Name Phone Emergency Contact    [facility] [phone]       Local Emergency Department Emergency Department Address Emergency Department Phone    [facility] [employer] [facility] [address], [place] [address] [phone]      Suicide Prevention Lifeline Phone: Call or Text 338  Crisis Text Line: Text HOME to 732375     Step 6: Making the environment safer (plan for lethal means safety):   Hanging, drowning, or being shot - ensure the guns are locked away; reach out to your brother and dad for distraction and support.      Optional: What is most important to me and worth living for?:   To ensure that dad and brother don't go through the pain of losing you  To honor mom by staying alive  Hope that things will get better     Ramesh Safety Plan. Lucy Mora and Major Cadet. Used with permission of the authors.            Ashley Oconnor, [facility]                                                         ______________________________________________________________________    Individual Treatment Plan    Patient's Name: Cholo Friend  YOB: 2005    Date of Creation: 4/30/24  Date Treatment Plan Last Reviewed/Revised: 4/20/24    DSM5 Diagnoses:  296.32 (F33.1) Major Depressive Disorder, Recurrent Episode, Moderate _  Psychosocial / Contextual Factors: Death of mom, unemployment, limited social relationships  PROMIS (reviewed every 90 days): 4/22/24    Referral / Collaboration:  Referral to another professional/service is not indicated at this time..    Anticipated number of session for this episode of care: 9-12 sessions  Anticipation frequency of session: Weekly  Anticipated Duration of each session: 53 or more minutes  Treatment plan will be reviewed in 90 days or when goals have been changed.       MeasurableTreatment Goal(s) related to diagnosis / functional impairment(s)  Goal-Depression: Client will decrease depressed mood    I will know I've met my goal when I am less depressed.      Objective #A (Client Action)    Client will use identified behavioral and cognitive skills to challenge negative self talk 90% of the time.  Status: New - Date: 5/2/2024    Intervention(s)  Therapist will provide psychoeducation, behavioral activation, and cognitive restructuring.      Objective #B  Client will complete at least 10 minutes of ACE activities (e.g.Achievement, Closeness, and Enjoyment) or other Behavioral Activation goals per day.    Status: New - Date: 5/2/2024    Intervention(s)  Therapist will provide psychoeducation, behavioral activation, and cognitive restructuring.      Objective #C  Client will exercise 30 minutes 36 times in the next 12 weeks.  Status: New - Date: 5/2/2024    Intervention(s)  Therapist will provide psychoeducation, behavioral activation, and cognitive restructuring.    Patient has reviewed and agreed to the above plan.      Ashley Oconnor [facility]  April 30, 2024

## 2024-05-03 ENCOUNTER — THERAPY VISIT (OUTPATIENT)
Dept: PHYSICAL THERAPY | Facility: REHABILITATION | Age: 19
End: 2024-05-03
Payer: COMMERCIAL

## 2024-05-03 DIAGNOSIS — M62.831 MUSCLE SPASM OF RIGHT CALF: Primary | ICD-10-CM

## 2024-05-03 PROCEDURE — 97140 MANUAL THERAPY 1/> REGIONS: CPT | Mod: GP

## 2024-05-03 PROCEDURE — 97110 THERAPEUTIC EXERCISES: CPT | Mod: GP

## 2024-05-10 ENCOUNTER — THERAPY VISIT (OUTPATIENT)
Dept: PHYSICAL THERAPY | Facility: REHABILITATION | Age: 19
End: 2024-05-10
Payer: COMMERCIAL

## 2024-05-10 DIAGNOSIS — M62.831 MUSCLE SPASM OF RIGHT CALF: Primary | ICD-10-CM

## 2024-05-10 PROCEDURE — 97140 MANUAL THERAPY 1/> REGIONS: CPT | Mod: GP

## 2024-05-10 PROCEDURE — 97110 THERAPEUTIC EXERCISES: CPT | Mod: GP

## 2024-05-12 ENCOUNTER — HEALTH MAINTENANCE LETTER (OUTPATIENT)
Age: 19
End: 2024-05-12

## 2024-05-13 ENCOUNTER — VIRTUAL VISIT (OUTPATIENT)
Dept: PSYCHOLOGY | Facility: CLINIC | Age: 19
End: 2024-05-13
Payer: COMMERCIAL

## 2024-05-13 DIAGNOSIS — F33.1 MDD (MAJOR DEPRESSIVE DISORDER), RECURRENT EPISODE, MODERATE (H): Primary | ICD-10-CM

## 2024-05-13 PROCEDURE — 90832 PSYTX W PT 30 MINUTES: CPT | Mod: 95 | Performed by: COUNSELOR

## 2024-05-13 NOTE — PROGRESS NOTES
[facility]                                     Progress Note    Patient Name: Cholo Friend  Date: 5/13/24         Service Type: Individual      Session Start Time: 1:00p  Session End Time: 1:30p     Session Length: 30 minutes    Session #: 3    Attendees: Client attended alone    Service Modality:  Video Visit:      Provider verified identity through the following two step process.  Patient provided:  Patient was verified at admission/transfer    Telemedicine Visit: The patient's condition can be safely assessed and treated via synchronous audio and visual telemedicine encounter.      Reason for Telemedicine Visit: Patient has requested telehealth visit    Originating Site (Patient Location): Patient's home    Distant Site (Provider Location): Provider Remote Setting- Home Office    Consent:  The patient/guardian has verbally consented to: the potential risks and benefits of telemedicine (video visit) versus in person care; bill my insurance or make self-payment for services provided; and responsibility for payment of non-covered services.     Patient would like the video invitation sent by:  My Chart    Mode of Communication:  Video Conference via Amwell    Distant Location (Provider):  Off-site    As the provider I attest to compliance with applicable laws and regulations related to telemedicine.    DATA  Interactive Complexity: No  Crisis: No        Progress Since Last Session (Related to Symptoms / Goals / Homework):   Symptoms: Improving pt denied depressive symptoms this week.     Homework: Partially completed      Episode of Care Goals: Satisfactory progress - CONTEMPLATION (Considering change and yet undecided); Intervened by assessing the negative and positive thinking (ambivalence) about behavior change     Current / Ongoing Stressors and Concerns:   Today, pt reported he had stuck to his routine in the mornings and this has allowed him more energy. Pt reported after this he is  going to the gym with his brother. Pt reported having mothers day celebrations yesterday with his extended family. Writer inquired how this was without his mom and pt reported he hadn t thought about it much. Writer inquired what some memories of mom are and pt offered a memory from 2013 when they went to [place].  Pt reported he did not have much to talk about today. Writer attempted to help pt develop goals for this tx episode. Pt reported he would like to get back into meditation and noted he used to do silent meditations and visualizations. Writer and pt discussed if he would like to continue mental health services as he does not currently feel like he is struggling with mental health issues. Pt and writer discussed the possibility of career counseling. We set some goals regarding tracking MH symptoms and agreed to meet again in two weeks. Pt agreed to spend some time thinking if he wants to continue services between now and then.       Treatment Objective(s) Addressed in This Session:   Decrease frequency and intensity of feeling down, depressed, hopeless  increase understanding of steps in the grief process  identify two areas of life that you would like to have improved functioning       Intervention:  Skills training  Explore skills useful to client in current situation  Skills include assertiveness, communication, conflict management, problem-solving, relaxation, etc.     Solution-Focused Therapy  Explore patterns in patient's relationships and discussed options for new behaviors  Explore patterns in patient's actions and choices and discussed options for new behaviors     Cognitive-behavioral Therapy  Discuss common cognitive distortions, identified them in patient's life  Explore ways to challenge, replace, and act against these cognitions      Behavioral Activation  Discuss steps patient can take to become more involved in meaningful activity  Identify barriers to these activities and explored possible  solutions    Assessments completed prior to visit:  The following assessments were completed by patient for this visit:  PHQ9:       4/22/2024    10:13 AM   PHQ-9 SCORE   PHQ-9 Total Score MyChart 10 (Moderate depression)   PHQ-9 Total Score 10     PROMIS 10-Global Health (all questions and answers displayed):       4/22/2024    10:24 AM   PROMIS 10   In general, would you say your health is: Fair   In general, would you say your quality of life is: Good   In general, how would you rate your physical health? Good   In general, how would you rate your mental health, including your mood and your ability to think? Fair   In general, how would you rate your satisfaction with your social activities and relationships? Very good   In general, please rate how well you carry out your usual social activities and roles Very good   To what extent are you able to carry out your everyday physical activities such as walking, climbing stairs, carrying groceries, or moving a chair? Completely   In the past 7 days, how often have you been bothered by emotional problems such as feeling anxious, depressed, or irritable? Sometimes   In the past 7 days, how would you rate your fatigue on average? Moderate   In the past 7 days, how would you rate your pain on average, where 0 means no pain, and 10 means worst imaginable pain? 2   In general, would you say your health is: 2   In general, would you say your quality of life is: 3   In general, how would you rate your physical health? 3   In general, how would you rate your mental health, including your mood and your ability to think? 2   In general, how would you rate your satisfaction with your social activities and relationships? 4   In general, please rate how well you carry out your usual social activities and roles. (This includes activities at home, at work and in your community, and responsibilities as a parent, child, spouse, employee, friend, etc.) 4   To what extent are you able to  carry out your everyday physical activities such as walking, climbing stairs, carrying groceries, or moving a chair? 5   In the past 7 days, how often have you been bothered by emotional problems such as feeling anxious, depressed, or irritable? 3   In the past 7 days, how would you rate your fatigue on average? 3   In the past 7 days, how would you rate your pain on average, where 0 means no pain, and 10 means worst imaginable pain? 2   Global Mental Health Score 12   Global Physical Health Score 15   PROMIS TOTAL - SUBSCORES 27         ASSESSMENT: Current Emotional / Mental Status (status of significant symptoms):   Risk status (Self / Other harm or suicidal ideation)   Patient denies current fears or concerns for personal safety.   Patient denies current or recent suicidal ideation or behaviors.   Patient denies current or recent homicidal ideation or behaviors.   Patient denies current or recent self injurious behavior or ideation.   Patient denies other safety concerns.   Patient reports there has been no change in risk factors since their last session.     Patient reports there has been no change in protective factors since their last session.     Recommended that patient call 911 or go to the local ED should there be a change in any of these risk factors.     Appearance:   Appropriate    Eye Contact:   Poor   Psychomotor Behavior: Normal    Attitude:   Cooperative  Interested Guarded    Orientation:   All   Speech    Rate / Production: Monotone     Volume:  Normal    Mood:    Anxious  Sad    Affect:    Flat    Thought Content:  Clear    Thought Form:  Coherent  Logical    Insight:    Good  and Fair      Medication Review:     No current outpatient medications on file.     No current facility-administered medications for this visit.         Medication Compliance:   NA     Changes in Health Issues:   None reported     Chemical Use Review:   Substance Use: Chemical use reviewed, no active concerns identified       Tobacco Use: No current tobacco use.      Diagnosis:  296.32 (F33.1) Major Depressive Disorder, Recurrent Episode, Moderate _    Collateral Reports Completed:   Not Applicable    PLAN: (Patient Tasks / Therapist Tasks / Other)  Use SMART goals to develop a morning routine.       Ramesh Safety Plan      Creation Date: 4/22/24       Step 1: Warning signs:    Warning Signs    Worsening depression symptoms; hopelessness; feeling like death would be a way to stop the pain      Step 2: Internal coping strategies - Things I can do to take my mind off my problems without contacting another person:    Strategies    Pet Max the cat, exercise, play video games      Step 3: People and social settings that provide distraction:    Name Contact Information    Brother Yeny Lopez [phone]       Places    video games with friends    gym with brother    out to eat with friends/family      Step 4: People whom I can ask for help during a crisis:    Name Contact Information    Brother Yeny Lopez [phone]      Step 5: Professionals or agencies I can contact during a crisis:    Clinician/Agency Name Phone Emergency Contact    [facility] [phone]       Local Emergency Department Emergency Department Address Emergency Department Phone    [facility] [address], [place] [address] [phone]      Suicide Prevention Lifeline Phone: Call or Text 225  Crisis Text Line: Text HOME to 019467     Step 6: Making the environment safer (plan for lethal means safety):   Hanging, drowning, or being shot - ensure the guns are locked away; reach out to your brother and dad for distraction and support.      Optional: What is most important to me and worth living for?:   To ensure that dad and brother don't go through the pain of losing you  To honor mom by staying alive  Hope that things will get better     Ramesh Safety Plan.  Lucy Mora and Major Cadet. Used with permission of the authors.            Ashley Oconnor, [facility]                                                         ______________________________________________________________________    Individual Treatment Plan    Patient's Name: Cholo Friend  YOB: 2005    Date of Creation: 4/30/24  Date Treatment Plan Last Reviewed/Revised: 4/20/24    DSM5 Diagnoses: 296.32 (F33.1) Major Depressive Disorder, Recurrent Episode, Moderate _  Psychosocial / Contextual Factors: Death of mom, unemployment, limited social relationships  PROMIS (reviewed every 90 days): 4/22/24    Referral / Collaboration:  Referral to another professional/service is not indicated at this time..    Anticipated number of session for this episode of care: 9-12 sessions  Anticipation frequency of session: Weekly  Anticipated Duration of each session: 53 or more minutes  Treatment plan will be reviewed in 90 days or when goals have been changed.       MeasurableTreatment Goal(s) related to diagnosis / functional impairment(s)  Goal-Depression: Client will decrease depressed mood    I will know I've met my goal when I am less depressed.      Objective #A (Client Action)    Client will use identified behavioral and cognitive skills to challenge negative self talk 90% of the time.  Status: New - Date: 5/2/2024    Intervention(s)  Therapist will provide psychoeducation, behavioral activation, and cognitive restructuring.      Objective #B  Client will complete at least 10 minutes of ACE activities (e.g.Achievement, Closeness, and Enjoyment) or other Behavioral Activation goals per day.    Status: New - Date: 5/2/2024    Intervention(s)  Therapist will provide psychoeducation, behavioral activation, and cognitive restructuring.      Objective #C  Client will exercise 30 minutes 36 times in the next 12 weeks.  Status: New - Date: 5/2/2024    Intervention(s)  Therapist will provide psychoeducation,  behavioral activation, and cognitive restructuring.    Patient has reviewed and agreed to the above plan.      Ashley Oconnor, [facility]  April 30, 2024

## 2024-05-16 ENCOUNTER — THERAPY VISIT (OUTPATIENT)
Dept: PHYSICAL THERAPY | Facility: REHABILITATION | Age: 19
End: 2024-05-16
Payer: COMMERCIAL

## 2024-05-16 DIAGNOSIS — M62.831 MUSCLE SPASM OF RIGHT CALF: Primary | ICD-10-CM

## 2024-05-16 PROCEDURE — 97110 THERAPEUTIC EXERCISES: CPT | Mod: GP

## 2024-05-16 PROCEDURE — 97140 MANUAL THERAPY 1/> REGIONS: CPT | Mod: GP

## 2024-05-22 ENCOUNTER — THERAPY VISIT (OUTPATIENT)
Dept: PHYSICAL THERAPY | Facility: REHABILITATION | Age: 19
End: 2024-05-22
Payer: COMMERCIAL

## 2024-05-22 DIAGNOSIS — M62.831 MUSCLE SPASM OF RIGHT CALF: Primary | ICD-10-CM

## 2024-05-22 PROCEDURE — 97110 THERAPEUTIC EXERCISES: CPT | Mod: GP

## 2024-05-29 ENCOUNTER — VIRTUAL VISIT (OUTPATIENT)
Dept: PSYCHOLOGY | Facility: CLINIC | Age: 19
End: 2024-05-29
Payer: COMMERCIAL

## 2024-05-29 DIAGNOSIS — F33.1 MDD (MAJOR DEPRESSIVE DISORDER), RECURRENT EPISODE, MODERATE (H): Primary | ICD-10-CM

## 2024-05-29 PROCEDURE — 90834 PSYTX W PT 45 MINUTES: CPT | Mod: 95 | Performed by: COUNSELOR

## 2024-05-30 ENCOUNTER — TELEPHONE (OUTPATIENT)
Dept: PHYSICAL THERAPY | Facility: REHABILITATION | Age: 19
End: 2024-05-30

## 2024-05-31 NOTE — PROGRESS NOTES
[facility]                                     Progress Note    Patient Name: Cholo Friend  Date: 5/29/24         Service Type: Individual      Session Start Time: 1:00p  Session End Time: 1:30p     Session Length: 30 minutes    Session #: 4    Attendees: Client attended alone    Service Modality:  Video Visit:      Provider verified identity through the following two step process.  Patient provided:  Patient was verified at admission/transfer    Telemedicine Visit: The patient's condition can be safely assessed and treated via synchronous audio and visual telemedicine encounter.      Reason for Telemedicine Visit: Patient has requested telehealth visit    Originating Site (Patient Location): Patient's home    Distant Site (Provider Location): Provider Remote Setting- Home Office    Consent:  The patient/guardian has verbally consented to: the potential risks and benefits of telemedicine (video visit) versus in person care; bill my insurance or make self-payment for services provided; and responsibility for payment of non-covered services.     Patient would like the video invitation sent by:  My Chart    Mode of Communication:  Video Conference via Amwell    Distant Location (Provider):  Off-site    As the provider I attest to compliance with applicable laws and regulations related to telemedicine.    DATA  Interactive Complexity: No  Crisis: No        Progress Since Last Session (Related to Symptoms / Goals / Homework):   Symptoms: Improving pt denied depressive symptoms this week.     Homework: Partially completed      Episode of Care Goals: Satisfactory progress - CONTEMPLATION (Considering change and yet undecided); Intervened by assessing the negative and positive thinking (ambivalence) about behavior change     Current / Ongoing Stressors and Concerns:   Today, pt reported he has not done the bullet journaling on MH symptoms that we talked about. Pt reported he has done some organizing  around his room and feels like things are  getting traction.  Pt did report a decrease in depressive symptoms and noted feeling as though they are much more manageable. Pt reported a belief that this is due to being more structured in his day and having things to distract him. Pt reported he is also exercising on a regular basis. Writer inquired if pt is feeling like he needs mental health therapy. Pt reported he does not feel like he has anything to work on but does continue to want support in study habits, staying on task, and having the motivation to do things. Pt and writer discussed changed of change and pt identified himself as somewhere between preparation and action. We discussed what it would take for pt to get to maintenance stage of change. Pt reported through practice, he gains confidence, and that confidence allows him to continue with change.       Treatment Objective(s) Addressed in This Session:   Decrease frequency and intensity of feeling down, depressed, hopeless  increase understanding of steps in the grief process  identify two areas of life that you would like to have improved functioning       Intervention:  Skills training  Explore skills useful to client in current situation  Skills include assertiveness, communication, conflict management, problem-solving, relaxation, etc.     Solution-Focused Therapy  Explore patterns in patient's relationships and discussed options for new behaviors  Explore patterns in patient's actions and choices and discussed options for new behaviors     Cognitive-behavioral Therapy  Discuss common cognitive distortions, identified them in patient's life  Explore ways to challenge, replace, and act against these cognitions      Behavioral Activation  Discuss steps patient can take to become more involved in meaningful activity  Identify barriers to these activities and explored possible solutions    Assessments completed prior to visit:  The following assessments  were completed by patient for this visit:  PHQ9:       4/22/2024    10:13 AM   PHQ-9 SCORE   PHQ-9 Total Score MyChart 10 (Moderate depression)   PHQ-9 Total Score 10     PROMIS 10-Global Health (all questions and answers displayed):       4/22/2024    10:24 AM   PROMIS 10   In general, would you say your health is: Fair   In general, would you say your quality of life is: Good   In general, how would you rate your physical health? Good   In general, how would you rate your mental health, including your mood and your ability to think? Fair   In general, how would you rate your satisfaction with your social activities and relationships? Very good   In general, please rate how well you carry out your usual social activities and roles Very good   To what extent are you able to carry out your everyday physical activities such as walking, climbing stairs, carrying groceries, or moving a chair? Completely   In the past 7 days, how often have you been bothered by emotional problems such as feeling anxious, depressed, or irritable? Sometimes   In the past 7 days, how would you rate your fatigue on average? Moderate   In the past 7 days, how would you rate your pain on average, where 0 means no pain, and 10 means worst imaginable pain? 2   In general, would you say your health is: 2   In general, would you say your quality of life is: 3   In general, how would you rate your physical health? 3   In general, how would you rate your mental health, including your mood and your ability to think? 2   In general, how would you rate your satisfaction with your social activities and relationships? 4   In general, please rate how well you carry out your usual social activities and roles. (This includes activities at home, at work and in your community, and responsibilities as a parent, child, spouse, employee, friend, etc.) 4   To what extent are you able to carry out your everyday physical activities such as walking, climbing stairs,  carrying groceries, or moving a chair? 5   In the past 7 days, how often have you been bothered by emotional problems such as feeling anxious, depressed, or irritable? 3   In the past 7 days, how would you rate your fatigue on average? 3   In the past 7 days, how would you rate your pain on average, where 0 means no pain, and 10 means worst imaginable pain? 2   Global Mental Health Score 12   Global Physical Health Score 15   PROMIS TOTAL - SUBSCORES 27         ASSESSMENT: Current Emotional / Mental Status (status of significant symptoms):   Risk status (Self / Other harm or suicidal ideation)   Patient denies current fears or concerns for personal safety.   Patient denies current or recent suicidal ideation or behaviors.   Patient denies current or recent homicidal ideation or behaviors.   Patient denies current or recent self injurious behavior or ideation.   Patient denies other safety concerns.   Patient reports there has been no change in risk factors since their last session.     Patient reports there has been no change in protective factors since their last session.     Recommended that patient call 911 or go to the local ED should there be a change in any of these risk factors.     Appearance:   Appropriate    Eye Contact:   Poor   Psychomotor Behavior: Normal    Attitude:   Cooperative  Interested Guarded    Orientation:   All   Speech    Rate / Production: Monotone     Volume:  Normal    Mood:    Anxious  Sad    Affect:    Flat    Thought Content:  Clear    Thought Form:  Coherent  Logical    Insight:    Good  and Fair      Medication Review:     No current outpatient medications on file.     No current facility-administered medications for this visit.         Medication Compliance:   NA     Changes in Health Issues:   None reported     Chemical Use Review:   Substance Use: Chemical use reviewed, no active concerns identified      Tobacco Use: No current tobacco use.      Diagnosis:  296.32 (F33.1) Major  Depressive Disorder, Recurrent Episode, Moderate _    Collateral Reports Completed:   Not Applicable    PLAN: (Patient Tasks / Therapist Tasks / Other)  Use SMART goals to develop a morning routine.       Ramesh Safety Plan      Creation Date: 4/22/24       Step 1: Warning signs:    Warning Signs    Worsening depression symptoms; hopelessness; feeling like death would be a way to stop the pain      Step 2: Internal coping strategies - Things I can do to take my mind off my problems without contacting another person:    Strategies    Pet Max the cat, exercise, play video games      Step 3: People and social settings that provide distraction:    Name Contact Information    Brother Yeny Lopez [phone]       Places    video games with friends    gym with brother    out to eat with friends/family      Step 4: People whom I can ask for help during a crisis:    Name Contact Information    Brother Yeny Lopez [phone]      Step 5: Professionals or agencies I can contact during a crisis:    Clinician/Agency Name Phone Emergency Contact    [facility] Crisis [phone]       Local Emergency Department Emergency Department Address Emergency Department Phone    [facility] [address], [place], [place] [address] [phone]      Suicide Prevention Lifeline Phone: Call or Text 613  Crisis Text Line: Text HOME to 519433     Step 6: Making the environment safer (plan for lethal means safety):   Hanging, drowning, or being shot - ensure the guns are locked away; reach out to your brother and dad for distraction and support.      Optional: What is most important to me and worth living for?:   To ensure that dad and brother don't go through the pain of losing you  To honor mom by staying alive  Hope that things will get better     Ramesh Safety Plan. Lucy Mora and Major Cadet. Used with permission of the authors.             Ashley Elvin, [facility]                                                         ______________________________________________________________________    Individual Treatment Plan    Patient's Name: Cholo Friend  YOB: 2005    Date of Creation: 4/30/24  Date Treatment Plan Last Reviewed/Revised: 4/20/24    DSM5 Diagnoses: 296.32 (F33.1) Major Depressive Disorder, Recurrent Episode, Moderate _  Psychosocial / Contextual Factors: Death of mom, unemployment, limited social relationships  PROMIS (reviewed every 90 days): 4/22/24    Referral / Collaboration:  Referral to another professional/service is not indicated at this time..    Anticipated number of session for this episode of care: 9-12 sessions  Anticipation frequency of session: Weekly  Anticipated Duration of each session: 53 or more minutes  Treatment plan will be reviewed in 90 days or when goals have been changed.       MeasurableTreatment Goal(s) related to diagnosis / functional impairment(s)  Goal-Depression: Client will decrease depressed mood    I will know I've met my goal when I am less depressed.      Objective #A (Client Action)    Client will use identified behavioral and cognitive skills to challenge negative self talk 90% of the time.  Status: New - Date: 5/2/2024    Intervention(s)  Therapist will provide psychoeducation, behavioral activation, and cognitive restructuring.      Objective #B  Client will complete at least 10 minutes of ACE activities (e.g.Achievement, Closeness, and Enjoyment) or other Behavioral Activation goals per day.    Status: New - Date: 5/2/2024    Intervention(s)  Therapist will provide psychoeducation, behavioral activation, and cognitive restructuring.      Objective #C  Client will exercise 30 minutes 36 times in the next 12 weeks.  Status: New - Date: 5/2/2024    Intervention(s)  Therapist will provide psychoeducation, behavioral activation, and cognitive restructuring.    Patient has reviewed and  agreed to the above plan.      Ashely Oconnor, [facility]  April 30, 2024

## 2024-06-10 ENCOUNTER — THERAPY VISIT (OUTPATIENT)
Dept: PHYSICAL THERAPY | Facility: REHABILITATION | Age: 19
End: 2024-06-10
Payer: COMMERCIAL

## 2024-06-10 DIAGNOSIS — M62.831 MUSCLE SPASM OF RIGHT CALF: Primary | ICD-10-CM

## 2024-06-10 PROCEDURE — 97140 MANUAL THERAPY 1/> REGIONS: CPT | Mod: GP

## 2024-06-10 PROCEDURE — 97110 THERAPEUTIC EXERCISES: CPT | Mod: GP

## 2024-06-10 NOTE — PROGRESS NOTES
[facility]                                                                                   OUTPATIENT PHYSICAL THERAPY    PLAN OF TREATMENT FOR OUTPATIENT REHABILITATION   Patient's Last Name, First Name, Cholo Jesus YOB: 2005   Provider's Name   [facility]   Medical Record No.  [id number]     Onset Date: 03/11/24 (referral date)  Start of Care Date: 04/26/24     Medical Diagnosis:  muscle spasm of right calf      PT Treatment Diagnosis:  Gastrocnemius/soleus tightness; hamstring tigthness; abnormal gait; Plan of Treatment  Frequency/Duration: 1x/week, decreasing to every other/ 16 weeks    Certification date from (P) 06/10/24 to (P) 09/07/24         See note for plan of treatment details and functional goals     Iva Isaac PT                         I CERTIFY THE NEED FOR THESE SERVICES FURNISHED UNDER        THIS PLAN OF TREATMENT AND WHILE UNDER MY CARE     (Physician attestation of this document indicates review and certification of the therapy plan).              Referring Provider:  Dorie Kiran    Initial Assessment  See Epic Evaluation- Start of Care Date: 04/26/24            PLAN  Continue therapy per current plan of care.    Beginning/End Dates of Progress Note Reporting Period:  4/26/24 to 06/10/2024    Referring Provider:  Dorie Kiran        06/10/24 0500   Appointment Info   Signing clinician's name / credentials Iva Isaac PT   Total/Authorized Visits 12   Visits Used 6   Medical Diagnosis muscle spasm of right calf   PT Tx Diagnosis Gastrocnemius/soleus tightness; hamstring tigthness; abnormal gait;   Quick Adds Certification   Progress Note/Certification   Start of Care Date 04/26/24   Onset of illness/injury or Date of Surgery 03/11/24  (referral date)   Therapy Frequency 1x/week, decreasing to every other   Predicted Duration 16 weeks   Certification date from 06/10/24    Certification date to 09/07/24   Progress Note Due Date 04/26/24   Progress Note Completed Date 06/10/24   GOALS   PT Goals 2;3;4   PT Goal 1   Goal Identifier Ankle DF ROM - STG   Goal Description Pt will improve ankle AROM DF by at least 10 degrees bilaterally to help improve gait pattern   Rationale to maximize safety and independence within the community;to maximize safety and independence within the home   Goal Progress in progress   Target Date 06/21/24   PT Goal 2   Goal Identifier Ankle DF ROM - LTG   Goal Description Pt will improve ankle AROM DF by at least 20 degrees in order to improve gait pattern bilaterally   Rationale to maximize safety and independence within the home;to maximize safety and independence within the community   Goal Progress in progress   Target Date 08/16/24   PT Goal 3   Goal Identifier Toe flexion   Goal Description Pt will be able to achieve WFL toe flexion B to help achieve improved gait mechanics   Rationale to maximize safety and independence within the community;to maximize safety and independence within the home   Goal Progress in progress   Target Date 08/16/24   PT Goal 4   Goal Identifier HEP IND   Goal Description Pt will report IND with HEP, performing exercises 5/7 days each week and demonstarte approriate form in order to progress to home management   Rationale to maximize safety and independence with self cares;to maximize safety and independence with performance of ADLs and functional tasks   Goal Progress in progress   Target Date 07/19/24   Subjective Report   Subjective Report Del shares he is doing well. He has not been able to see the orthotist yet. He reports he has been able to lift heavier weights as well.   Objective Measures   Objective Measures Objective Measure 1   Objective Measure 1   Objective Measure Inversion/Eversion strength   Details 5/5 B  on 5/3/24   Treatment Interventions (PT)   Interventions Therapeutic Procedure/Exercise;Manual Therapy  "  Therapeutic Procedure/Exercise   Therapeutic Procedures: strength, endurance, ROM, flexibility minutes (97059) 23   Therapeutic Procedures Ther Proc 2;Ther Proc 3;Ther Proc 4;Ther Proc 5   Ther Proc 1 On eval: Edu (to pt and brother) on eval findings and POC; edu on since there is such a great limitation on ROM and that pt has been walking a certain way for a long period of time, PT may not be able to achieve full ROM and that the goal of PT will be towards improved function and improved gait mechanics, though pt may not reach \"normal\" gait mechanics due to limitations; edu on importance of consistency of stretching and increasing hold times to 90 seconds to achieve the best affect   Ther Proc 1 - Details treadmill: 5 min self  selected speed   Ther Proc 2 seated ankle DF: yellow gym TB x 10 reps x 2 sets B   Ther Proc 2 - Details NT: half kneeling hip flexor stretch w/ emphasis on R ankle DF: x 2 sets x 30 seconds B   Ther Proc 3 NT: Forward Lunge: x 8 reps B, cues for  proper form   Ther Proc 3 - Details Ankle eversion: yellow gym TB x 10 reps B   Ther Proc 4 NT: Hip flexor stretch on stair: x 5  reps, 10 second holds   Ther Proc 4 - Details Gastroc Stretch Off Edge of Step: x 10 repsB   Ther Proc 5 NT: Seated hip ABD: x 12 reps, red TB   PTRx Ther Proc 1 Reviewed: Seated Hamstring Stretch   PTRx Ther Proc 1 - Details x45 sec/side, x2 sets; verbal cues for correct form and muscle stretch   PTRx Ther Proc 2 Reviewed: Towel Stretch Gastroc   PTRx Ther Proc 2 - Details x60 sec x2 sets on R; verbal cues for correct form and muscle stretch    PTRx Ther Proc 3 Great Toe Flexion with Patient Generated Overpressure   PTRx Ther Proc 3 - Details x60 sec x2 sets on R; verbal and tactile cues for correct form and muscle stretch   PTRx Ther Proc 4 Reviewed: Standing Gastroc Stretch   PTRx Ther Proc 4 - Details x45 sec/side, x2 sets; verbal and tactile cues for correct form and muscle stretch   PTRx Ther Proc 5 Reviewed: " Soleus Stretch on Chair   PTRx Ther Proc 5 - Details x60 sec/side on 2nd step; verbal cues for correct form and muscle stretch   Skilled Intervention ex to help improve gastroc/soleus ROM; cues for correct form; edu on findings of evaluation and what to expect for POC   Patient Response/Progress Pt tolerated ex well today   Manual Therapy   Manual Therapy: Mobilization, MFR, MLD, friction massage minutes (49397) 8   Manual Therapy Manual Therapy 2   Manual Therapy 1 Manual DF stretch to B ankle in long sitting   Manual Therapy 1 - Details NT: Ankle DF mobs to R ankle in long sitting with mob belt applied   Manual Therapy 2 Inferior distraction   Manual Therapy 2 - Details Ankle eversion mobs   Skilled Intervention manual stretching and mobs for increased B DF ROM   Patient Response/Progress Pt tolerated well, no pain or discomfort noted today, just a stretch   Education   Learner/Method Patient   Plan   Home program See PTRX   Plan for next session progress stretches and LE strength as able   Comments   Comments Assessment: Jose Elias returns to PT for his 6th visit for muscle spasm of right calf. He notes that he has not been as diligent about his stretches recently. Reviewed many of his stretches today and progressed his resistance bands for HEP. While he has made progress in his lifting goals at home, Jose Elias continues to demonstrate significant DF motion restriction B.  Pt continues  to benefit from skilled  PT for return to highest  level of function.   Total Session Time   Timed Code Treatment Minutes 31   Total Treatment Time (sum of timed and untimed services) 31

## 2024-07-16 ENCOUNTER — THERAPY VISIT (OUTPATIENT)
Dept: PHYSICAL THERAPY | Facility: REHABILITATION | Age: 19
End: 2024-07-16
Payer: COMMERCIAL

## 2024-07-16 DIAGNOSIS — M62.831 MUSCLE SPASM OF RIGHT CALF: Primary | ICD-10-CM

## 2024-07-16 PROCEDURE — 97140 MANUAL THERAPY 1/> REGIONS: CPT | Mod: GP | Performed by: PHYSICAL THERAPIST

## 2024-07-25 ENCOUNTER — THERAPY VISIT (OUTPATIENT)
Dept: PHYSICAL THERAPY | Facility: REHABILITATION | Age: 19
End: 2024-07-25
Payer: COMMERCIAL

## 2024-07-25 DIAGNOSIS — M62.831 MUSCLE SPASM OF RIGHT CALF: Primary | ICD-10-CM

## 2024-07-25 PROCEDURE — 97140 MANUAL THERAPY 1/> REGIONS: CPT | Mod: GP | Performed by: PHYSICAL THERAPIST

## 2024-11-13 ENCOUNTER — OFFICE VISIT (OUTPATIENT)
Dept: FAMILY MEDICINE | Facility: CLINIC | Age: 19
End: 2024-11-13
Payer: COMMERCIAL

## 2024-11-13 VITALS
HEART RATE: 102 BPM | BODY MASS INDEX: 18.01 KG/M2 | HEIGHT: 72 IN | TEMPERATURE: 97.5 F | RESPIRATION RATE: 20 BRPM | OXYGEN SATURATION: 98 % | DIASTOLIC BLOOD PRESSURE: 77 MMHG | SYSTOLIC BLOOD PRESSURE: 137 MMHG | WEIGHT: 133 LBS

## 2024-11-13 DIAGNOSIS — M62.831 MUSCLE SPASM OF RIGHT CALF: ICD-10-CM

## 2024-11-13 DIAGNOSIS — F41.1 GENERALIZED ANXIETY DISORDER: ICD-10-CM

## 2024-11-13 DIAGNOSIS — F33.0 MAJOR DEPRESSIVE DISORDER, RECURRENT EPISODE, MILD (H): Primary | ICD-10-CM

## 2024-11-13 RX ORDER — ESCITALOPRAM OXALATE 10 MG/1
10 TABLET ORAL DAILY
Qty: 30 TABLET | Refills: 1 | Status: SHIPPED | OUTPATIENT
Start: 2024-11-13

## 2024-11-13 ASSESSMENT — PATIENT HEALTH QUESTIONNAIRE - PHQ9
SUM OF ALL RESPONSES TO PHQ QUESTIONS 1-9: 13
SUM OF ALL RESPONSES TO PHQ QUESTIONS 1-9: 13
10. IF YOU CHECKED OFF ANY PROBLEMS, HOW DIFFICULT HAVE THESE PROBLEMS MADE IT FOR YOU TO DO YOUR WORK, TAKE CARE OF THINGS AT HOME, OR GET ALONG WITH OTHER PEOPLE: SOMEWHAT DIFFICULT

## 2024-11-13 NOTE — PROGRESS NOTES
Assessment & Plan     Major depressive disorder, recurrent episode, mild (H)  Start selective serotonin reuptake inhibitor and pursue in person therapy  - Adult Mental Health  Referral; Future  - Adult Mental Health  Referral; Future  - escitalopram (LEXAPRO) 10 MG tablet; Take 1 tablet (10 mg) by mouth daily.    Generalized anxiety disorder  Start escitalopram, in person therapy,  neuropsych referral  I suspect there is some executive dysfunction here secondary to anxiety but also trauma response given challenges in childhood. Assess for ADHD/ etc to clarify best therapy modalities.     - Adult Mental Health  Referral; Future  - Adult Mental Health  Referral; Future  - escitalopram (LEXAPRO) 10 MG tablet; Take 1 tablet (10 mg) by mouth daily.    Muscle spasm of right calf  I believe the foot specialist was orthotist, consider PM&R eval to assess if there is underlying other underlying concern.    The longitudinal plan of care for the diagnosis(es)/condition(s) as documented were addressed during this visit. Due to the added complexity in care, I will continue to support Del in the subsequent management and with ongoing continuity of care.      Depression Screening Follow Up        11/13/2024     1:51 PM   PHQ   PHQ-9 Total Score 13    Q9: Thoughts of better off dead/self-harm past 2 weeks Several days    F/U: Thoughts of suicide or self-harm Yes    F/U: Self harm-plan No    F/U: Self-harm action No    F/U: Safety concerns Yes        Patient-reported                     Follow Up Actions Taken  Mental Health Referral placed    Discussed the following ways the patient can remain in a safe environment:  be around others        No follow-ups on file.    Subjective   Del is a 19 year old, presenting for the following health issues:  RECHECK (Mood, got referral to therapist elsewhere, but felt like not coordinate well with them. Preferred in person section instead of virtual) and  "Offered covid and flu but declined for now      11/13/2024     1:58 PM   Additional Questions   Roomed by Awais   Accompanied by brother         11/13/2024    Information    services provided? No        HPI     Mental health: Jose Elias and his brother think maybe Jose Elias needs in person therapy. He was given some advice, but but feels like maybe Jose Elias himself wasn't being as transparent with what was going on. Feels that change of scene for the therapy would help. Reports severe ongoing thoughts about worst case scenarios.   Needs dental check  Calf spasm: Etiology still uncertain. Has had at least since middle school. PT has helped-he can stretch it much more now, able to flex to 90 degrees though heel at rest is off floor. Reports he saw a \"foot specialist\" who said it could be coming form his back but that it's unlikely he'd need surgery for it.                     Objective    BP (!) 167/84   Pulse 102   Temp 97.5  F (36.4  C)   Resp 20   Ht 1.829 m (6')   Wt 60.3 kg (133 lb)   SpO2 98%   BMI 18.04 kg/m    Body mass index is 18.04 kg/m .  Physical Exam     GEN: Well appearing, no distress  HENT: Eomi, no conjunctival injection, normocephalic  PULM: Unlabored breathing  SKIN: No visible rash  PSYCH: Affect bright, appropriate to content   MSK: Right heel at rest off ground            Signed Electronically by: Dorie Kiran MD    Answers submitted by the patient for this visit:  Patient Health Questionnaire (Submitted on 11/13/2024)  If you checked off any problems, how difficult have these problems made it for you to do your work, take care of things at home, or get along with other people?: Somewhat difficult  PHQ9 TOTAL SCORE: 13    "

## 2024-12-09 ENCOUNTER — OFFICE VISIT (OUTPATIENT)
Dept: FAMILY MEDICINE | Facility: CLINIC | Age: 19
End: 2024-12-09
Payer: COMMERCIAL

## 2024-12-09 VITALS
OXYGEN SATURATION: 94 % | BODY MASS INDEX: 17.88 KG/M2 | DIASTOLIC BLOOD PRESSURE: 91 MMHG | HEART RATE: 89 BPM | HEIGHT: 72 IN | RESPIRATION RATE: 18 BRPM | WEIGHT: 132 LBS | SYSTOLIC BLOOD PRESSURE: 138 MMHG | TEMPERATURE: 97.7 F

## 2024-12-09 DIAGNOSIS — F41.1 GENERALIZED ANXIETY DISORDER: Primary | ICD-10-CM

## 2024-12-09 DIAGNOSIS — M62.831 MUSCLE SPASM OF RIGHT CALF: ICD-10-CM

## 2024-12-09 DIAGNOSIS — F33.0 MAJOR DEPRESSIVE DISORDER, RECURRENT EPISODE, MILD (H): ICD-10-CM

## 2024-12-09 RX ORDER — ESCITALOPRAM OXALATE 10 MG/1
10 TABLET ORAL DAILY
Qty: 30 TABLET | Refills: 1 | Status: SHIPPED | OUTPATIENT
Start: 2024-12-09

## 2024-12-09 ASSESSMENT — ANXIETY QUESTIONNAIRES
IF YOU CHECKED OFF ANY PROBLEMS ON THIS QUESTIONNAIRE, HOW DIFFICULT HAVE THESE PROBLEMS MADE IT FOR YOU TO DO YOUR WORK, TAKE CARE OF THINGS AT HOME, OR GET ALONG WITH OTHER PEOPLE: NOT DIFFICULT AT ALL
2. NOT BEING ABLE TO STOP OR CONTROL WORRYING: SEVERAL DAYS
3. WORRYING TOO MUCH ABOUT DIFFERENT THINGS: SEVERAL DAYS
1. FEELING NERVOUS, ANXIOUS, OR ON EDGE: SEVERAL DAYS
6. BECOMING EASILY ANNOYED OR IRRITABLE: NOT AT ALL
7. FEELING AFRAID AS IF SOMETHING AWFUL MIGHT HAPPEN: SEVERAL DAYS
7. FEELING AFRAID AS IF SOMETHING AWFUL MIGHT HAPPEN: SEVERAL DAYS
GAD7 TOTAL SCORE: 5
GAD7 TOTAL SCORE: 5
8. IF YOU CHECKED OFF ANY PROBLEMS, HOW DIFFICULT HAVE THESE MADE IT FOR YOU TO DO YOUR WORK, TAKE CARE OF THINGS AT HOME, OR GET ALONG WITH OTHER PEOPLE?: NOT DIFFICULT AT ALL
GAD7 TOTAL SCORE: 5
4. TROUBLE RELAXING: NOT AT ALL
5. BEING SO RESTLESS THAT IT IS HARD TO SIT STILL: SEVERAL DAYS

## 2024-12-09 ASSESSMENT — PATIENT HEALTH QUESTIONNAIRE - PHQ9: SUM OF ALL RESPONSES TO PHQ QUESTIONS 1-9: 4

## 2024-12-09 NOTE — PROGRESS NOTES
"  Assessment & Plan     Generalized anxiety disorder  Patient minimizing his challenges today, poor insight into his lack of motivation. He does want to work on therapy and neuropsych testing. AVS today with the numbers to call for scheduling outside appts. He is going to start the escitalopram. Utlimately, he reports significant barriers to going out, new social situations, and he has minimal external pressures to seek employment or finish school as he currently lives with family.  - escitalopram (LEXAPRO) 10 MG tablet; Take 1 tablet (10 mg) by mouth daily.    Major depressive disorder, recurrent episode, mild (H)  - escitalopram (LEXAPRO) 10 MG tablet; Take 1 tablet (10 mg) by mouth daily.    Calf spasm  Uncertain etiology. At this point I think that mild CP is very unlikely given I can't detect any upper limb problems. Could be a manifestation of tethered cord, though no bowel/bladder concerns, would call his reflexes brisk but WNL, and right clearly more prominent spasticity than left. Could also consider neuropathy. At this point, I think he most needs bracing to keep the muscle stretched. Could consider spine imaging, EMG, PM&R referral.     The longitudinal plan of care for the diagnosis(es)/condition(s) as documented were addressed during this visit. Due to the added complexity in care, I will continue to support Jose Elias in the subsequent management and with ongoing continuity of care.               No follow-ups on file.    Subjective   Jose Elias is a 19 year old, presenting for the following health issues:  RECHECK (anxiety)    HPI     Mental health: Jose Elias is here for follow up. Last visit we planned to start escitalopram to help with his running thoughts and social anxieties that were getting in the way of sleep and job seeking. He is here without his older brother today for the first time. He has not made progress in getting a job. Fairly low scores today for PHQ and DIOR-7. \"Fear itself is what makes me nervous but " "I've been a lot calmer\" Has been spending his days studying math because biggest long term goal is to pursue electrical engineering. With school, graduated HS in 2021. Wants to feel more comfortable with math and reading before going back to school, he used to be bad at math- but now going back to fundamentals having an easier.   Right calf spasm: Somewhat worsened from last visit, not doing consistent stretches and cannot get heel to floor currently. No right upper extremity symptoms. PT was somewhat successful, we ordered orthotics (slpint)  back in the spring but never go them. PT noted Flattened arches standing, increased arch seated, and toe extension. He rusn wi                  Objective    BP (!) 138/91   Pulse 89   Temp 97.7  F (36.5  C)   Resp 18   Ht 1.829 m (6')   Wt 59.9 kg (132 lb)   SpO2 94%   BMI 17.90 kg/m    Body mass index is 17.9 kg/m .  Physical Exam     MSK: running gait notable for flat, out toeing with right foot persistently on toes and right heel not meeting the floor. Arches high seated with feet of the floor though flat with weightbearing. Bilateral 2+ patellar reflex with minimal stimulation. Downgoing/neutral great toes bilaterally with babinski testing. Normal sensation to light touch, temperature, vibration, position sense. I cannot flex his right ankle passively to 90 degrees.            Signed Electronically by: Dorie Kiran MD    "

## 2024-12-09 NOTE — PATIENT INSTRUCTIONS
Call any of the following locations to set up both therapy and ADHD testing. Each of these places offers both testing and in person therapy.     [facility]   [address]   [place] [address]   [phone] Phone   [phone] Fax   M-F 7:30AM-7PM   Saturday 8AM-2PM     [address] [address]   [phone] Phone   [phone] Fax   M-Th 8-8pm   F 8-4:30pm     [address] [address]   [phone]     [facility]   [address] [place] [address] [address]   [place], [place] [address]   [phone] Phone   [phone] Fax   Hours: M-F 7:30-5:30pm     [facility] [place] [facility]   [address]   [place] [address]   [phone] ( for appointments)   Fax: [phone]   7:30 am - 5 pm M-F, appointments available on Saturdays       [facility]   [address] [place] [address] [address],   [place] [address]   [phone] Phone   [phone] Fax   M-Th: 8:30-5pm   F : 8:30-4:30pm

## 2024-12-26 ENCOUNTER — MYC MEDICAL ADVICE (OUTPATIENT)
Dept: CARE COORDINATION | Facility: CLINIC | Age: 19
End: 2024-12-26
Payer: COMMERCIAL

## 2025-01-08 DIAGNOSIS — M62.831 MUSCLE SPASM OF RIGHT CALF: Primary | ICD-10-CM

## 2025-02-12 ENCOUNTER — TELEPHONE (OUTPATIENT)
Dept: FAMILY MEDICINE | Facility: CLINIC | Age: 20
End: 2025-02-12

## 2025-02-12 NOTE — TELEPHONE ENCOUNTER
Patient Returning Call    Reason for call:  Patient's brother is requesting a phone call to discuss further steps to assist patient in recovery. Patient's brother states patient has anxiety and depression. Best time to call would be anytime after 9 AM.    Information relayed to patient:  Please allow provider at least 72 hrs to respond.        Could we send this information to you in [employer] or would you prefer to receive a phone call?:   Patient would prefer a phone call   Okay to leave a detailed message?: Yes at Other phone number:  [phone]

## 2025-02-18 ENCOUNTER — PATIENT OUTREACH (OUTPATIENT)
Dept: CARE COORDINATION | Facility: CLINIC | Age: 20
End: 2025-02-18
Payer: COMMERCIAL

## 2025-02-18 NOTE — CONFIDENTIAL NOTE
Clinic Care Coordination Contact  Care Team Conversations    SW called patient's brother regarding patient's mental health referral. Patient's brother reported patient is struggling with patient's mental health.     Patient's brother stated patient has not yet started the lexapro prescribed by PCP 12/9/25. SW confirmed prescription sent to [facility] on [address] and that patient/brother can call and request prescription be filled. Patient's brother vocalized intent for patient/brother to call pharmacy this date or tomorrow, take medications for approximately one month and see PCP and SW regarding referrals. SW scheduled patient for PCP and SW appointments at [facility] 3/26/25.    Patient's brother requested letter for patient stating that due to patient's anxiety and depression, patient may not be ideal candidate for jury selection or to be full impartial due to patient's mental health symptoms at this time.    Patient's brother requested email from  with contact information for questions/concerns as patient's brother is not currently able to take down  contact information. Patient's brother provided verbal authorization for SW to email contact information for SW to [email or web address]    ROCHELLE CADET, SHANNAN, MARSHA

## 2025-02-19 ENCOUNTER — PATIENT OUTREACH (OUTPATIENT)
Dept: CARE COORDINATION | Facility: CLINIC | Age: 20
End: 2025-02-19
Payer: COMMERCIAL

## 2025-02-19 NOTE — CONFIDENTIAL NOTE
SW emailed patient's brother as verbally authorized this date with SW's contact information (email signature) at \[facility]\"". No patient PHI included in email.    ROCHELLE CADET, ANDREWSW, [facility]

## 2025-02-25 ENCOUNTER — TELEPHONE (OUTPATIENT)
Dept: FAMILY MEDICINE | Facility: CLINIC | Age: 20
End: 2025-02-25
Payer: COMMERCIAL

## 2025-02-25 NOTE — TELEPHONE ENCOUNTER
General Call      Reason for Call:  Patient brother called stating he spoke with Song  last week about requesting a letter from provider for jury duty stating he may not be ideal candidate due to his anxiety and depression. Patient have to mail out letter before march 1st, please review and if able to provide letter, patient would like it sent to [facility] and call him to let him now once completed thank you.        Could we send this information to you in [facility] or would you prefer to receive a phone call?:   Patient would prefer a phone call   Okay to leave a detailed message?: Yes at Cell number on file:    Telephone Information:   Mobile [phone]

## 2025-02-27 ENCOUNTER — VIRTUAL VISIT (OUTPATIENT)
Dept: FAMILY MEDICINE | Facility: CLINIC | Age: 20
End: 2025-02-27
Payer: COMMERCIAL

## 2025-02-27 DIAGNOSIS — F41.1 GENERALIZED ANXIETY DISORDER: Primary | ICD-10-CM

## 2025-02-27 PROCEDURE — 98013 SYNCH AUDIO-ONLY EST LOW 20: CPT | Performed by: FAMILY MEDICINE

## 2025-02-27 NOTE — PROGRESS NOTES
Family Medicine Telephone Visit Note          No chief complaint on file.       name: ***  Agency: {FMinterpagency:259455}  Language: {FMinterplan}   Telephone number: ***  Type of interpretation: {FamMedInterpType:129504}         HPI   Patients name: Jose Elias  Appointment start time:  3:30 PM    {Superlists (FM):473705}    Anxiety follow up:   Jose Elias is scheduled for Jury duty, and he's     Will  lexapro today.           Current Outpatient Medications   Medication Sig Dispense Refill    escitalopram (LEXAPRO) 10 MG tablet Take 1 tablet (10 mg) by mouth daily. 30 tablet 1     Allergies   Allergen Reactions    No Known Allergies               Review of Systems:     {ROS COMP (Optional):377890}         Physical Exam:     There were no vitals taken for this visit.  Estimated body mass index is 17.9 kg/m  as calculated from the following:    Height as of 24: 1.829 m (6').    Weight as of 24: 59.9 kg (132 lb).    Exam:  Constitutional: {:526658}  Psychiatric: {:314923}    {Result Choices:392754}        Assessment and Plan   {Diag Picklist:860037}    Refilled medications that would be required in the next 3 months.     After Visit Information:  {avs options:642462}    No follow-ups on file.    Appointment end time: 3:40 PM  This is a telephone visit that took *** minutes.      Clinician location:  [facility]     Dorie Kiran MD  I precepted today with ***.    {Bill telephone visit time codes. Coding will change to an E&M code if the patient's insurance allows for this.  However, documentation should support E&M code billing.  98856: 5-10 min  29351: 11-20 min  25961: 21-30 min}      {[employer] Language Line Access (Help text- F2 to highlight then hit delete)    Dial [phone]    Answer  Welcome to the Language Line Services.  Please enter your six-digit client ID.    [place] enter [id number]  [place] enter [id number]  [place] enter [id number]  [place] enter  "[id number]    Answer  \"For [place], press 1.  For all other languages, press 2.\"   they will ask you to say the name of the language.    Press either 1 (yes, correct) or 2 (no, incorrect).}    "

## 2025-03-26 ENCOUNTER — OFFICE VISIT (OUTPATIENT)
Dept: FAMILY MEDICINE | Facility: CLINIC | Age: 20
End: 2025-03-26
Payer: COMMERCIAL

## 2025-03-26 ENCOUNTER — ALLIED HEALTH/NURSE VISIT (OUTPATIENT)
Dept: FAMILY MEDICINE | Facility: CLINIC | Age: 20
End: 2025-03-26
Payer: COMMERCIAL

## 2025-03-26 VITALS
RESPIRATION RATE: 22 BRPM | WEIGHT: 130 LBS | HEIGHT: 72 IN | HEART RATE: 68 BPM | TEMPERATURE: 97.5 F | OXYGEN SATURATION: 100 % | BODY MASS INDEX: 17.61 KG/M2 | DIASTOLIC BLOOD PRESSURE: 92 MMHG | SYSTOLIC BLOOD PRESSURE: 141 MMHG

## 2025-03-26 DIAGNOSIS — M62.831 MUSCLE SPASM OF RIGHT CALF: ICD-10-CM

## 2025-03-26 DIAGNOSIS — F33.0 MAJOR DEPRESSIVE DISORDER, RECURRENT EPISODE, MILD: Primary | ICD-10-CM

## 2025-03-26 DIAGNOSIS — F41.1 GENERALIZED ANXIETY DISORDER: ICD-10-CM

## 2025-03-26 PROCEDURE — 99214 OFFICE O/P EST MOD 30 MIN: CPT | Performed by: FAMILY MEDICINE

## 2025-03-26 PROCEDURE — 3079F DIAST BP 80-89 MM HG: CPT | Performed by: FAMILY MEDICINE

## 2025-03-26 PROCEDURE — 3077F SYST BP >= 140 MM HG: CPT | Performed by: FAMILY MEDICINE

## 2025-03-26 NOTE — PROGRESS NOTES
Clinic Care Coordination Contact  Follow Up Progress Note      Assessment:     Patient presented to appointment this date:    Appearance: alert; broad affect;   Speech: no slurred or pressured speech;   Emotion: positive; stable  Perception: no presenting or reported symptoms consistent with hallucinations, delusions or episodes of dissociation this date.  Thought Content: Patient reported no suicidal or homicidal ideation or intent this date.  Insight & Judgement: insight fair; judgement fair; impulse control fair  Cognition: oriented x3; memory appeared mostly intact short-term and long-term; participative; attentive to task    SW introduced himself to patient and patient's father this date. MELBA introduced resources and support provided by MELBA. SW affirmed patient's having started therapy and having picked up escitalopram. Patient reported having attended a couple in-person sessions with new therapist at [facility] and reported feeling comfortable and able to address patient's needs in therapy. Patient reported having discussed escitolopram more with provider this date. Patient brought medication to appointment this date. Patient reported being uncertain about referral for ADHD; patient informed that if testing is wanted at any point, [facility] does ADHD testing and therapist can help patient schedule it.     Care Gaps:    Health Maintenance Due   Topic Date Due    ADVANCE CARE PLANNING  Never done    DEPRESSION ACTION PLAN  Never done    HIV SCREENING  Never done    HPV IMMUNIZATION (1 - Male 3-dose series) Never done    MENINGITIS B IMMUNIZATION (1 of 2 - Standard) Never done    HEPATITIS C SCREENING  Never done    YEARLY PREVENTIVE VISIT  04/11/2023    COVID-19 Vaccine (4 - 2024-25 season) 09/01/2024    DEPRESSION 12 MO INDEX REPEAT PHQ-9  02/21/2025       Care G[facility].      Intervention/Education provided during outreach: Mental health symptom screen; follow-up on mental health  referrals; explanation of SW/Care Coordination services; identification of successful coping    Plan:   Patient to continue therapy at [facility].    Patient to start escitolpram.    Care Coordinator will follow up in as/if needed by patient.    ROCHELLE CADET, SHANNAN, LADC

## 2025-03-26 NOTE — PROGRESS NOTES
Assessment & Plan     Generalized anxiety disorder  Patient improving. Reports attending therapy in-person with [facility]. Encouraged to continue attending in-person therapy weekly. Additionally discussed grounding exercises and meditative practices for anxiety. Patient has not taken escitalopram, and was counseled on efficacy and safety of escitalopram. Overall, reports he has made progress on his anxiety and is improving. Reports feeling motivated to pursue electrical engineering school this fall, and to continue therapy.  -has escitalopram prescription.     Major depressive disorder, recurrent episode, mild  Continue to make progress towards taking escitalopram.     Calf spasm  Has been doing exercises more and his chronic right calf spasm is currently under somewhat better control-he can touch down his heel which he could not last visit. Has prior referral for bracing, encouraged to call and schedule.    No follow-ups on file.    Jason Moctezuma is a 20 year old, presenting for the following health issues:  RECHECK (Mood and medication)        3/26/2025     1:25 PM   Additional Questions   Roomed by Awais   Accompanied by Dad         3/26/2025    Information    services provided? No     HPI    He has not taken escitalopram since his last visit. He did attend in-person therapy. He sees a therapist with [facility]. He has attended therapy twice and feels like his mood has improved since attending therapy. He is motivated to attend more therapy sessions.      Review of Systems  Constitutional, neuro, ENT, endocrine, pulmonary, cardiac, gastrointestinal, genitourinary, musculoskeletal, integument and psychiatric systems are negative, except as otherwise noted.      Objective    BP (!) 141/92   Pulse 68   Temp 97.5  F (36.4  C)   Resp 22   Ht 1.829 m (6')   Wt 59 kg (130 lb)   SpO2 100%   BMI 17.63 kg/m    Body mass index is 17.63 kg/m .  Physical Exam   GENERAL: alert  and no distress  EYES: Extraocular movements intact  RESP: lungs clear to auscultation - no rales, rhonchi or wheezes, no increased work of breathing  CV: regular rate and rhythm, normal S1 S2.  NEURO: mentation intact and speech normal  MSK: When seated relaxed with knees at 90 degree angle right heel does not touch the ground, left normal. With minimal effort, able to passively flex the ankle to 90 degrees and heel can touch floor.  PSYCH: mentation appears normal, affect normal/bright      I was present with the medical student who participated in the service and in the documentation of this note. I have verified the history and personally performed the physical exam and medical decision making, and have verified the content of the note, which accurately reflects my assessment of the patient and the plan of care.   Dorie Kiran MD      Dee Zhao, MS3  March 26, 2025  2:02 PM    Signed Electronically by: Dorie Kiran MD

## 2025-05-18 ENCOUNTER — HEALTH MAINTENANCE LETTER (OUTPATIENT)
Age: 20
End: 2025-05-18